# Patient Record
Sex: MALE | Race: WHITE | Employment: OTHER | URBAN - METROPOLITAN AREA
[De-identification: names, ages, dates, MRNs, and addresses within clinical notes are randomized per-mention and may not be internally consistent; named-entity substitution may affect disease eponyms.]

---

## 2018-08-16 ENCOUNTER — TELEPHONE (OUTPATIENT)
Dept: INTERNAL MEDICINE CLINIC | Facility: CLINIC | Age: 63
End: 2018-08-16

## 2018-08-16 NOTE — TELEPHONE ENCOUNTER
Future Appointments  Date Time Provider Sony Roberta   8/24/2018 9:30 AM Milla Chicas MD [facility] MENTAL HEALTH NP ORTHO Jimy Rk   10/1/2018 3:15 PM Alonso Berkowitz MD [facility] IM       Patient is going to see AS as a new patient but not till October.  Pa

## 2018-08-16 NOTE — TELEPHONE ENCOUNTER
Please change PCP and confirm insurance information first then send back to Clinical to address concerns.

## 2018-08-16 NOTE — TELEPHONE ENCOUNTER
Spoke with patient and added Insurance Information(BCBS PPO). Patient would also like a recommendation of a name of a Physician at [facility] for a colonoscopy.   Please advise

## 2018-08-17 NOTE — TELEPHONE ENCOUNTER
Patient notified we would not be able to process any orders until seen by AS, establish appt scheduled for 10/1/18 due to Dr Marya Moran halfway.   Pt given [facility] information along with Dr. Melonie Dubin, Dr Alcala Manpreet al as recommendations for colonoscopy(last

## 2018-09-17 ENCOUNTER — APPOINTMENT (OUTPATIENT)
Dept: LAB | Facility: HOSPITAL | Age: 63
End: 2018-09-17
Attending: ORTHOPAEDIC SURGERY
Payer: COMMERCIAL

## 2018-09-17 DIAGNOSIS — M17.12 OSTEOARTHRITIS OF LEFT KNEE: ICD-10-CM

## 2018-09-17 LAB
ALBUMIN SERPL-MCNC: 3.7 G/DL (ref 3.5–4.8)
ALBUMIN/GLOB SERPL: 1.2 {RATIO} (ref 1–2)
ALP LIVER SERPL-CCNC: 79 U/L (ref 45–117)
ALT SERPL-CCNC: 25 U/L (ref 17–63)
ANION GAP SERPL CALC-SCNC: 7 MMOL/L (ref 0–18)
ANTIBODY SCREEN: NEGATIVE
APTT PPP: 31.3 SECONDS (ref 26.1–34.6)
AST SERPL-CCNC: 18 U/L (ref 15–41)
ATRIAL RATE: 64 BPM
BASOPHILS # BLD AUTO: 0.01 X10(3) UL (ref 0–0.1)
BASOPHILS NFR BLD AUTO: 0.2 %
BILIRUB SERPL-MCNC: 0.4 MG/DL (ref 0.1–2)
BUN BLD-MCNC: 13 MG/DL (ref 8–20)
BUN/CREAT SERPL: 10.2 (ref 10–20)
CALCIUM BLD-MCNC: 8.6 MG/DL (ref 8.3–10.3)
CHLORIDE SERPL-SCNC: 109 MMOL/L (ref 101–111)
CO2 SERPL-SCNC: 26 MMOL/L (ref 22–32)
CREAT BLD-MCNC: 1.27 MG/DL (ref 0.7–1.3)
EOSINOPHIL # BLD AUTO: 0.07 X10(3) UL (ref 0–0.3)
EOSINOPHIL NFR BLD AUTO: 1.7 %
ERYTHROCYTE [DISTWIDTH] IN BLOOD BY AUTOMATED COUNT: 12.5 % (ref 11.5–16)
GLOBULIN PLAS-MCNC: 3 G/DL (ref 2.5–4)
GLUCOSE BLD-MCNC: 104 MG/DL (ref 70–99)
HCT VFR BLD AUTO: 44.6 % (ref 37–53)
HGB BLD-MCNC: 15.1 G/DL (ref 13–17)
IMMATURE GRANULOCYTE COUNT: 0.01 X10(3) UL (ref 0–1)
IMMATURE GRANULOCYTE RATIO %: 0.2 %
INR BLD: 1.05 (ref 0.9–1.1)
LYMPHOCYTES # BLD AUTO: 1.24 X10(3) UL (ref 0.9–4)
LYMPHOCYTES NFR BLD AUTO: 29.5 %
M PROTEIN MFR SERPL ELPH: 6.7 G/DL (ref 6.1–8.3)
MCH RBC QN AUTO: 30 PG (ref 27–33.2)
MCHC RBC AUTO-ENTMCNC: 33.9 G/DL (ref 31–37)
MCV RBC AUTO: 88.7 FL (ref 80–99)
MONOCYTES # BLD AUTO: 0.34 X10(3) UL (ref 0.1–1)
MONOCYTES NFR BLD AUTO: 8.1 %
NEUTROPHIL ABS PRELIM: 2.53 X10 (3) UL (ref 1.3–6.7)
NEUTROPHILS # BLD AUTO: 2.53 X10(3) UL (ref 1.3–6.7)
NEUTROPHILS NFR BLD AUTO: 60.3 %
OSMOLALITY SERPL CALC.SUM OF ELEC: 294 MOSM/KG (ref 275–295)
P AXIS: 49 DEGREES
P-R INTERVAL: 146 MS
PLATELET # BLD AUTO: 224 10(3)UL (ref 150–450)
POTASSIUM SERPL-SCNC: 4.1 MMOL/L (ref 3.6–5.1)
PSA SERPL DL<=0.01 NG/ML-MCNC: 14.1 SECONDS (ref 12.4–14.7)
Q-T INTERVAL: 404 MS
QRS DURATION: 88 MS
QTC CALCULATION (BEZET): 416 MS
R AXIS: 1 DEGREES
RBC # BLD AUTO: 5.03 X10(6)UL (ref 4.3–5.7)
RED CELL DISTRIBUTION WIDTH-SD: 41.1 FL (ref 35.1–46.3)
RH BLOOD TYPE: POSITIVE
SODIUM SERPL-SCNC: 142 MMOL/L (ref 136–144)
T AXIS: 22 DEGREES
VENTRICULAR RATE: 64 BPM
WBC # BLD AUTO: 4.2 X10(3) UL (ref 4–13)

## 2018-09-17 PROCEDURE — 87081 CULTURE SCREEN ONLY: CPT

## 2018-09-17 PROCEDURE — 85730 THROMBOPLASTIN TIME PARTIAL: CPT

## 2018-09-17 PROCEDURE — 93005 ELECTROCARDIOGRAM TRACING: CPT

## 2018-09-17 PROCEDURE — 93010 ELECTROCARDIOGRAM REPORT: CPT | Performed by: INTERNAL MEDICINE

## 2018-09-17 PROCEDURE — 86850 RBC ANTIBODY SCREEN: CPT

## 2018-09-17 PROCEDURE — 86901 BLOOD TYPING SEROLOGIC RH(D): CPT

## 2018-09-17 PROCEDURE — 36415 COLL VENOUS BLD VENIPUNCTURE: CPT

## 2018-09-17 PROCEDURE — 85610 PROTHROMBIN TIME: CPT

## 2018-09-17 PROCEDURE — 86900 BLOOD TYPING SEROLOGIC ABO: CPT

## 2018-09-17 PROCEDURE — 85025 COMPLETE CBC W/AUTO DIFF WBC: CPT

## 2018-09-17 PROCEDURE — 80053 COMPREHEN METABOLIC PANEL: CPT

## 2018-10-01 ENCOUNTER — OFFICE VISIT (OUTPATIENT)
Dept: INTERNAL MEDICINE CLINIC | Facility: CLINIC | Age: 63
End: 2018-10-01
Payer: COMMERCIAL

## 2018-10-01 VITALS
RESPIRATION RATE: 16 BRPM | TEMPERATURE: 99 F | HEIGHT: 74 IN | DIASTOLIC BLOOD PRESSURE: 80 MMHG | WEIGHT: 268 LBS | BODY MASS INDEX: 34.39 KG/M2 | HEART RATE: 68 BPM | SYSTOLIC BLOOD PRESSURE: 124 MMHG

## 2018-10-01 DIAGNOSIS — Z01.818 PRE-OP EXAMINATION: ICD-10-CM

## 2018-10-01 DIAGNOSIS — Z12.5 SCREENING PSA (PROSTATE SPECIFIC ANTIGEN): ICD-10-CM

## 2018-10-01 DIAGNOSIS — Z23 NEEDS FLU SHOT: ICD-10-CM

## 2018-10-01 DIAGNOSIS — Z13.220 SCREENING CHOLESTEROL LEVEL: ICD-10-CM

## 2018-10-01 DIAGNOSIS — N28.1 RENAL CYST, LEFT: ICD-10-CM

## 2018-10-01 DIAGNOSIS — G47.33 OSA (OBSTRUCTIVE SLEEP APNEA): ICD-10-CM

## 2018-10-01 DIAGNOSIS — M17.12 ARTHRITIS OF LEFT KNEE: Primary | ICD-10-CM

## 2018-10-01 DIAGNOSIS — E78.2 MIXED HYPERLIPIDEMIA: ICD-10-CM

## 2018-10-01 PROCEDURE — 99243 OFF/OP CNSLTJ NEW/EST LOW 30: CPT | Performed by: INTERNAL MEDICINE

## 2018-10-01 PROCEDURE — 90471 IMMUNIZATION ADMIN: CPT | Performed by: INTERNAL MEDICINE

## 2018-10-01 PROCEDURE — 90656 IIV3 VACC NO PRSV 0.5 ML IM: CPT | Performed by: INTERNAL MEDICINE

## 2018-10-01 RX ORDER — FLUTICASONE PROPIONATE 50 MCG
2 SPRAY, SUSPENSION (ML) NASAL DAILY
Qty: 16 G | Status: SHIPPED | OUTPATIENT
Start: 2018-10-01

## 2018-10-01 RX ORDER — ALBUTEROL SULFATE 90 UG/1
2 AEROSOL, METERED RESPIRATORY (INHALATION) EVERY 6 HOURS PRN
Qty: 1 INHALER | Refills: 3 | Status: SHIPPED | OUTPATIENT
Start: 2018-10-01

## 2018-10-01 NOTE — PROGRESS NOTES
Patient presents with:  Pre-Op Exam: Pt is having L knee replacement w/ Dr. Cici Ellis on 10/15/18. LB-rm 9      HPI:  Here for pre op for left knee replacement due to arthritis. Pt has stable earle mld uses oral appliance.  He has lost 40 lbs, has high chol prior t Colonoscopy (11/08) & (11/15/13) recheck 5 years - HAYDEN Meehan     Hepatitis C screening [9/17/14] - negative     Prostate cancer screening     Encounter for long-term (current) use of medications     Laboratory examination ordered as part of a routine general Allergies    Strawberries            ANAPHYLAXIS, SWELLING    Comment:All over entire body including throat    Health Maintenance  Immunizations:    Immunization History  Administered            Date(s) Administered    FLU VACC High Dose [facility] Coordination normal.   Skin: Skin is warm and dry. No rash noted. No erythema. No pallor. Psychiatric: Normal mood and affect. EKG in [facility], reviewed the read, unchanged from previous.    labbs reviewed in epic, stable  A/P:    Needs flu shot  Renal cy

## 2018-10-06 ENCOUNTER — HOSPITAL ENCOUNTER (OUTPATIENT)
Dept: ULTRASOUND IMAGING | Age: 63
Discharge: HOME | End: 2018-10-06
Attending: INTERNAL MEDICINE
Payer: COMMERCIAL

## 2018-10-06 DIAGNOSIS — N28.1 RENAL CYST, LEFT: ICD-10-CM

## 2018-10-06 PROCEDURE — 76770 US EXAM ABDO BACK WALL COMP: CPT | Performed by: INTERNAL MEDICINE

## 2018-10-08 PROBLEM — K63.5 POLYP OF COLON: Status: ACTIVE | Noted: 2018-10-08

## 2018-10-08 PROBLEM — K64.8 OTHER HEMORRHOIDS: Status: ACTIVE | Noted: 2018-10-08

## 2018-10-08 PROBLEM — Z86.010 PERSONAL HISTORY OF COLONIC POLYPS: Status: ACTIVE | Noted: 2018-10-08

## 2018-10-08 PROBLEM — Z86.0100 PERSONAL HISTORY OF COLONIC POLYPS: Status: ACTIVE | Noted: 2018-10-08

## 2018-10-08 PROBLEM — K57.30 DIVERTICULOSIS OF LARGE INTESTINE WITHOUT PERFORATION OR ABSCESS WITHOUT BLEEDING: Status: ACTIVE | Noted: 2018-10-08

## 2018-10-09 PROCEDURE — 81015 MICROSCOPIC EXAM OF URINE: CPT | Performed by: UROLOGY

## 2018-10-11 ENCOUNTER — LAB ENCOUNTER (OUTPATIENT)
Dept: LAB | Age: 63
End: 2018-10-11
Attending: INTERNAL MEDICINE
Payer: COMMERCIAL

## 2018-10-11 DIAGNOSIS — Z12.5 SCREENING PSA (PROSTATE SPECIFIC ANTIGEN): ICD-10-CM

## 2018-10-11 DIAGNOSIS — Z13.220 SCREENING CHOLESTEROL LEVEL: ICD-10-CM

## 2018-10-11 PROCEDURE — 80061 LIPID PANEL: CPT

## 2018-10-11 PROCEDURE — 36415 COLL VENOUS BLD VENIPUNCTURE: CPT

## 2018-10-12 ENCOUNTER — HOSPITAL ENCOUNTER (OUTPATIENT)
Dept: CT IMAGING | Age: 63
Discharge: HOME | End: 2018-10-12
Attending: UROLOGY
Payer: COMMERCIAL

## 2018-10-12 DIAGNOSIS — N28.1 COMPLEX RENAL CYST: ICD-10-CM

## 2018-10-12 PROCEDURE — 82565 ASSAY OF CREATININE: CPT

## 2018-10-12 PROCEDURE — 74178 CT ABD&PLV WO CNTR FLWD CNTR: CPT | Performed by: UROLOGY

## 2018-10-15 ENCOUNTER — ANESTHESIA (OUTPATIENT)
Dept: SURGERY | Facility: HOSPITAL | Age: 63
DRG: 470 | End: 2018-10-15
Payer: COMMERCIAL

## 2018-10-15 ENCOUNTER — HOSPITAL ENCOUNTER (INPATIENT)
Facility: HOSPITAL | Age: 63
LOS: 1 days | Discharge: HOME HEALTH | DRG: 470 | End: 2018-10-16
Attending: ORTHOPAEDIC SURGERY | Admitting: ORTHOPAEDIC SURGERY
Payer: COMMERCIAL

## 2018-10-15 ENCOUNTER — APPOINTMENT (OUTPATIENT)
Dept: GENERAL RADIOLOGY | Facility: HOSPITAL | Age: 63
DRG: 470 | End: 2018-10-15
Attending: PHYSICIAN ASSISTANT
Payer: COMMERCIAL

## 2018-10-15 ENCOUNTER — ANESTHESIA EVENT (OUTPATIENT)
Dept: SURGERY | Facility: HOSPITAL | Age: 63
DRG: 470 | End: 2018-10-15
Payer: COMMERCIAL

## 2018-10-15 DIAGNOSIS — M17.12 OSTEOARTHRITIS OF LEFT KNEE: Primary | ICD-10-CM

## 2018-10-15 DIAGNOSIS — M17.12 PRIMARY OSTEOARTHRITIS OF LEFT KNEE: ICD-10-CM

## 2018-10-15 PROCEDURE — 73560 X-RAY EXAM OF KNEE 1 OR 2: CPT | Performed by: PHYSICIAN ASSISTANT

## 2018-10-15 PROCEDURE — 99221 1ST HOSP IP/OBS SF/LOW 40: CPT | Performed by: INTERNAL MEDICINE

## 2018-10-15 PROCEDURE — 0SRD0J9 REPLACEMENT OF LEFT KNEE JOINT WITH SYNTHETIC SUBSTITUTE, CEMENTED, OPEN APPROACH: ICD-10-PCS | Performed by: ORTHOPAEDIC SURGERY

## 2018-10-15 PROCEDURE — 3E0T3BZ INTRODUCTION OF ANESTHETIC AGENT INTO PERIPHERAL NERVES AND PLEXI, PERCUTANEOUS APPROACH: ICD-10-PCS | Performed by: ANESTHESIOLOGY

## 2018-10-15 DEVICE — ATTUNE KNEE SYSTEM TIBIAL BASE ROTATING PLATFORM SIZE 9 CEMENTED
Type: IMPLANTABLE DEVICE | Site: KNEE | Status: FUNCTIONAL
Brand: ATTUNE

## 2018-10-15 DEVICE — ATTUNE KNEE SYSTEM FEMORAL POSTERIOR STABILIZED SIZE 8 LEFT CEMENTED
Type: IMPLANTABLE DEVICE | Site: KNEE | Status: FUNCTIONAL
Brand: ATTUNE

## 2018-10-15 DEVICE — CEMENT BONE RADIOPAQ HOWMEDICA: Type: IMPLANTABLE DEVICE | Site: KNEE | Status: FUNCTIONAL

## 2018-10-15 DEVICE — ATTUNE KNEE SYSTEM TIBIAL INSERT ROTATING PLATFORM POSTERIOR STABILIZED 8 7MM AOX
Type: IMPLANTABLE DEVICE | Site: KNEE | Status: FUNCTIONAL
Brand: ATTUNE

## 2018-10-15 RX ORDER — MEPERIDINE HYDROCHLORIDE 25 MG/ML
12.5 INJECTION INTRAMUSCULAR; INTRAVENOUS; SUBCUTANEOUS AS NEEDED
Status: DISCONTINUED | OUTPATIENT
Start: 2018-10-15 | End: 2018-10-15 | Stop reason: HOSPADM

## 2018-10-15 RX ORDER — NALOXONE HYDROCHLORIDE 0.4 MG/ML
80 INJECTION, SOLUTION INTRAMUSCULAR; INTRAVENOUS; SUBCUTANEOUS AS NEEDED
Status: DISCONTINUED | OUTPATIENT
Start: 2018-10-15 | End: 2018-10-15 | Stop reason: HOSPADM

## 2018-10-15 RX ORDER — DOCUSATE SODIUM 100 MG/1
100 CAPSULE, LIQUID FILLED ORAL 2 TIMES DAILY
Status: DISCONTINUED | OUTPATIENT
Start: 2018-10-15 | End: 2018-10-16

## 2018-10-15 RX ORDER — METOCLOPRAMIDE HYDROCHLORIDE 5 MG/ML
10 INJECTION INTRAMUSCULAR; INTRAVENOUS AS NEEDED
Status: DISCONTINUED | OUTPATIENT
Start: 2018-10-15 | End: 2018-10-15 | Stop reason: HOSPADM

## 2018-10-15 RX ORDER — TIZANIDINE 2 MG/1
2 TABLET ORAL EVERY 6 HOURS PRN
Qty: 60 TABLET | Refills: 0 | Status: SHIPPED | OUTPATIENT
Start: 2018-10-15 | End: 2019-06-05 | Stop reason: ALTCHOICE

## 2018-10-15 RX ORDER — DIPHENHYDRAMINE HYDROCHLORIDE 50 MG/ML
25 INJECTION INTRAMUSCULAR; INTRAVENOUS ONCE AS NEEDED
Status: ACTIVE | OUTPATIENT
Start: 2018-10-15 | End: 2018-10-15

## 2018-10-15 RX ORDER — CHLORAL HYDRATE 500 MG
1000 CAPSULE ORAL DAILY
Status: ON HOLD | COMMUNITY
End: 2018-10-15

## 2018-10-15 RX ORDER — KETOROLAC TROMETHAMINE 30 MG/ML
30 INJECTION, SOLUTION INTRAMUSCULAR; INTRAVENOUS EVERY 6 HOURS
Status: COMPLETED | OUTPATIENT
Start: 2018-10-15 | End: 2018-10-16

## 2018-10-15 RX ORDER — SCOLOPAMINE TRANSDERMAL SYSTEM 1 MG/1
1 PATCH, EXTENDED RELEASE TRANSDERMAL ONCE
Status: DISCONTINUED | OUTPATIENT
Start: 2018-10-15 | End: 2018-10-16

## 2018-10-15 RX ORDER — FEXOFENADINE HCL 180 MG/1
180 TABLET ORAL DAILY
COMMUNITY

## 2018-10-15 RX ORDER — ACETAMINOPHEN 325 MG/1
TABLET ORAL
Status: COMPLETED
Start: 2018-10-15 | End: 2018-10-15

## 2018-10-15 RX ORDER — ONDANSETRON 2 MG/ML
4 INJECTION INTRAMUSCULAR; INTRAVENOUS EVERY 4 HOURS PRN
Status: DISCONTINUED | OUTPATIENT
Start: 2018-10-15 | End: 2018-10-16

## 2018-10-15 RX ORDER — OXYCODONE HYDROCHLORIDE 10 MG/1
10 TABLET ORAL EVERY 4 HOURS PRN
Status: DISCONTINUED | OUTPATIENT
Start: 2018-10-15 | End: 2018-10-16

## 2018-10-15 RX ORDER — SODIUM CHLORIDE 9 MG/ML
INJECTION, SOLUTION INTRAVENOUS CONTINUOUS
Status: DISCONTINUED | OUTPATIENT
Start: 2018-10-15 | End: 2018-10-16

## 2018-10-15 RX ORDER — MELATONIN
325
Status: DISCONTINUED | OUTPATIENT
Start: 2018-10-16 | End: 2018-10-16

## 2018-10-15 RX ORDER — OXYCODONE HCL 10 MG/1
10 TABLET, FILM COATED, EXTENDED RELEASE ORAL
Status: COMPLETED | OUTPATIENT
Start: 2018-10-15 | End: 2018-10-15

## 2018-10-15 RX ORDER — METOCLOPRAMIDE HYDROCHLORIDE 5 MG/ML
10 INJECTION INTRAMUSCULAR; INTRAVENOUS EVERY 6 HOURS PRN
Status: DISCONTINUED | OUTPATIENT
Start: 2018-10-15 | End: 2018-10-16

## 2018-10-15 RX ORDER — SODIUM CHLORIDE, SODIUM LACTATE, POTASSIUM CHLORIDE, CALCIUM CHLORIDE 600; 310; 30; 20 MG/100ML; MG/100ML; MG/100ML; MG/100ML
INJECTION, SOLUTION INTRAVENOUS CONTINUOUS
Status: DISCONTINUED | OUTPATIENT
Start: 2018-10-15 | End: 2018-10-16

## 2018-10-15 RX ORDER — SODIUM CHLORIDE, SODIUM LACTATE, POTASSIUM CHLORIDE, CALCIUM CHLORIDE 600; 310; 30; 20 MG/100ML; MG/100ML; MG/100ML; MG/100ML
INJECTION, SOLUTION INTRAVENOUS CONTINUOUS
Status: DISCONTINUED | OUTPATIENT
Start: 2018-10-15 | End: 2018-10-15 | Stop reason: HOSPADM

## 2018-10-15 RX ORDER — ACETAMINOPHEN 500 MG
1000 TABLET ORAL ONCE
Status: DISCONTINUED | OUTPATIENT
Start: 2018-10-15 | End: 2018-10-15 | Stop reason: HOSPADM

## 2018-10-15 RX ORDER — MORPHINE SULFATE 4 MG/ML
2 INJECTION, SOLUTION INTRAMUSCULAR; INTRAVENOUS EVERY 5 MIN PRN
Status: DISCONTINUED | OUTPATIENT
Start: 2018-10-15 | End: 2018-10-15 | Stop reason: HOSPADM

## 2018-10-15 RX ORDER — MORPHINE SULFATE 4 MG/ML
1 INJECTION, SOLUTION INTRAMUSCULAR; INTRAVENOUS EVERY 2 HOUR PRN
Status: DISCONTINUED | OUTPATIENT
Start: 2018-10-15 | End: 2018-10-16

## 2018-10-15 RX ORDER — TRAMADOL HYDROCHLORIDE 50 MG/1
50 TABLET ORAL EVERY 6 HOURS
Status: DISCONTINUED | OUTPATIENT
Start: 2018-10-15 | End: 2018-10-16

## 2018-10-15 RX ORDER — OXYCODONE HYDROCHLORIDE 5 MG/1
5 TABLET ORAL EVERY 4 HOURS PRN
Status: DISCONTINUED | OUTPATIENT
Start: 2018-10-15 | End: 2018-10-16

## 2018-10-15 RX ORDER — DIPHENHYDRAMINE HYDROCHLORIDE 50 MG/ML
12.5 INJECTION INTRAMUSCULAR; INTRAVENOUS EVERY 4 HOURS PRN
Status: DISCONTINUED | OUTPATIENT
Start: 2018-10-15 | End: 2018-10-16

## 2018-10-15 RX ORDER — MORPHINE SULFATE 4 MG/ML
2 INJECTION, SOLUTION INTRAMUSCULAR; INTRAVENOUS EVERY 2 HOUR PRN
Status: DISCONTINUED | OUTPATIENT
Start: 2018-10-15 | End: 2018-10-16

## 2018-10-15 RX ORDER — ALBUTEROL SULFATE 90 UG/1
2 AEROSOL, METERED RESPIRATORY (INHALATION) EVERY 6 HOURS PRN
Status: DISCONTINUED | OUTPATIENT
Start: 2018-10-15 | End: 2018-10-16

## 2018-10-15 RX ORDER — OXYCODONE HYDROCHLORIDE 15 MG/1
15 TABLET ORAL EVERY 4 HOURS PRN
Status: DISCONTINUED | OUTPATIENT
Start: 2018-10-15 | End: 2018-10-16

## 2018-10-15 RX ORDER — DIPHENHYDRAMINE HCL 25 MG
25 CAPSULE ORAL EVERY 4 HOURS PRN
Status: DISCONTINUED | OUTPATIENT
Start: 2018-10-15 | End: 2018-10-16

## 2018-10-15 RX ORDER — ACETAMINOPHEN 325 MG/1
650 TABLET ORAL 4 TIMES DAILY
Status: DISCONTINUED | OUTPATIENT
Start: 2018-10-15 | End: 2018-10-16

## 2018-10-15 RX ORDER — DOCUSATE SODIUM 100 MG/1
100 CAPSULE, LIQUID FILLED ORAL 2 TIMES DAILY
Qty: 60 CAPSULE | Refills: 0 | Status: SHIPPED | OUTPATIENT
Start: 2018-10-15 | End: 2019-06-05 | Stop reason: ALTCHOICE

## 2018-10-15 RX ORDER — ASPIRIN 81 MG/1
81 TABLET ORAL DAILY
Status: ON HOLD | COMMUNITY
End: 2018-10-15

## 2018-10-15 RX ORDER — SODIUM PHOSPHATE, DIBASIC AND SODIUM PHOSPHATE, MONOBASIC 7; 19 G/133ML; G/133ML
1 ENEMA RECTAL ONCE AS NEEDED
Status: DISCONTINUED | OUTPATIENT
Start: 2018-10-15 | End: 2018-10-16

## 2018-10-15 RX ORDER — OXYCODONE HYDROCHLORIDE AND ACETAMINOPHEN 5; 325 MG/1; MG/1
1 TABLET ORAL EVERY 4 HOURS PRN
Qty: 80 TABLET | Refills: 0 | Status: SHIPPED | OUTPATIENT
Start: 2018-10-15 | End: 2018-10-25

## 2018-10-15 RX ORDER — BISACODYL 10 MG
10 SUPPOSITORY, RECTAL RECTAL
Status: DISCONTINUED | OUTPATIENT
Start: 2018-10-15 | End: 2018-10-16

## 2018-10-15 RX ORDER — MORPHINE SULFATE 4 MG/ML
4 INJECTION, SOLUTION INTRAMUSCULAR; INTRAVENOUS EVERY 2 HOUR PRN
Status: DISCONTINUED | OUTPATIENT
Start: 2018-10-15 | End: 2018-10-16

## 2018-10-15 RX ORDER — LABETALOL HYDROCHLORIDE 5 MG/ML
5 INJECTION, SOLUTION INTRAVENOUS EVERY 5 MIN PRN
Status: DISCONTINUED | OUTPATIENT
Start: 2018-10-15 | End: 2018-10-15 | Stop reason: HOSPADM

## 2018-10-15 RX ORDER — ONDANSETRON 2 MG/ML
4 INJECTION INTRAMUSCULAR; INTRAVENOUS AS NEEDED
Status: DISCONTINUED | OUTPATIENT
Start: 2018-10-15 | End: 2018-10-15 | Stop reason: HOSPADM

## 2018-10-15 RX ORDER — SENNOSIDES 8.6 MG
17.2 TABLET ORAL NIGHTLY
Status: DISCONTINUED | OUTPATIENT
Start: 2018-10-15 | End: 2018-10-16

## 2018-10-15 RX ORDER — POLYETHYLENE GLYCOL 3350 17 G/17G
17 POWDER, FOR SOLUTION ORAL DAILY PRN
Status: DISCONTINUED | OUTPATIENT
Start: 2018-10-15 | End: 2018-10-16

## 2018-10-15 RX ORDER — FLUTICASONE PROPIONATE 50 MCG
2 SPRAY, SUSPENSION (ML) NASAL DAILY
Status: DISCONTINUED | OUTPATIENT
Start: 2018-10-15 | End: 2018-10-16

## 2018-10-15 RX ORDER — TIZANIDINE 4 MG/1
4 TABLET ORAL 3 TIMES DAILY PRN
Status: DISCONTINUED | OUTPATIENT
Start: 2018-10-15 | End: 2018-10-16

## 2018-10-15 RX ORDER — MIDAZOLAM HYDROCHLORIDE 1 MG/ML
1 INJECTION INTRAMUSCULAR; INTRAVENOUS EVERY 5 MIN PRN
Status: DISCONTINUED | OUTPATIENT
Start: 2018-10-15 | End: 2018-10-15 | Stop reason: HOSPADM

## 2018-10-15 RX ORDER — CELECOXIB 200 MG/1
200 CAPSULE ORAL DAILY
Qty: 30 CAPSULE | Refills: 0 | Status: SHIPPED | OUTPATIENT
Start: 2018-10-15 | End: 2018-11-12

## 2018-10-15 RX ORDER — OXYCODONE HCL 10 MG/1
10 TABLET, FILM COATED, EXTENDED RELEASE ORAL
Status: DISCONTINUED | OUTPATIENT
Start: 2018-10-15 | End: 2018-10-16

## 2018-10-15 RX ORDER — ACETAMINOPHEN 325 MG/1
650 TABLET ORAL ONCE
Status: COMPLETED | OUTPATIENT
Start: 2018-10-15 | End: 2018-10-15

## 2018-10-15 NOTE — ANESTHESIA POSTPROCEDURE EVALUATION
[address] [facility] Patient Status:  Surgery Admit   Age/Gender 58year old male MRN [id number]   [facility] SURGERY Attending Wilfredo Goode MD   Hosp Day # 0 PCP Marcellus Chen MD       Anesthesia Post-op Note    Proced

## 2018-10-15 NOTE — PLAN OF CARE
RECD FROM RR PER BED, ALERT ,AWAKE, ORIENTED X4. UNABLE TO WIGGLES TOES YET. PEDAL PULSES PALPABLE. CALL LIGHT W/IN REACH. INSTRUCTED US IUSE AND TO CALL FOR ALL NEEDS AND ASSISTANCE. CALL LIGHT W/IN REACH. HR LOW 50 PUT ON TELE.  SKIN WARM AN DRY

## 2018-10-15 NOTE — ANESTHESIA PREPROCEDURE EVALUATION
PRE-OP EVALUATION    Patient Name: Jonathan Matta    Pre-op Diagnosis: Primary osteoarthritis of left knee [M17.12]    Procedure(s):  LEFT TOTAL KNEE REPLACEMENT    Surgeon(s) and Role:     Rosemarie Smith MD - Primary    Pre-op vitals reviewed. Pulmonary                    (+) sleep apnea       Neuro/Psych                              Patient Active Problem List:     ROUTINE GENERAL EXAM - 11/7/17*     BMI, (Adult) 40.0-44.9 kg/sq m (BMI 30 ~ 230 lbs) ([facility])     H/O (9/13) Rt inguinal hernia rep Available pre-op labs reviewed.   Lab Results   Component Value Date    WBC 4.2 09/17/2018    RBC 5.03 09/17/2018    HGB 15.1 09/17/2018    HCT 44.6 09/17/2018    MCV 88.7 09/17/2018    MCH 30.0 09/17/2018    MCHC 33.9 09/17/2018    RDW 12.5 09/17/2018

## 2018-10-15 NOTE — PROGRESS NOTES
Yola Geovany Matta   8/24/2018 9:30 AM   Office Visit   MRN:  [id number]   Description: 60year old male Provider: Kj Chicas MD Department: [facility]   Scanning Cover Sheet     Click to print Barcode Encounter Cover Sheet for scanning   Off Left knee has bone-on-bone medial compartment. Right knee has significant decrease in range of motion along the medial edge of the medial compartment.   This is present on the flexed weightbearing view.     Bilateral knee primary osteoarthritis:  Left knee

## 2018-10-15 NOTE — OPERATIVE REPORT
TOTAL KNEE OPERATIVE REPORT:  Merline Cue [facility]       [id number]     9/29/1955    PREOP DX: LEFT  KNEE PRIMARY OSTEOARTHRITIS  POSTOP DX:LEFT KNEE PRIMARY OSTEOARTHRITIS  PROCEDURE: LEFT TOTAL KNEE REPLACEMENT  SURGEON: Yue Gonzalez MD  FIRST ASSIST: E STABLE. FEMUR WAS FINISHED OFF WITH CHAMFER AND [address] CUTS IN USUAL FASHION. POSTERIOR FEMORAL OSTEOPHYTES WERE REMOVED. POSTERIOR CAPSULAR RELEASE WAS DONE CAREFULLY. PROXIMAL TIBIA WAS EXPOSED.   TIBIA WAS SIZED at 9 AND ROTATION WAS SET USING MEDI

## 2018-10-15 NOTE — H&P
[address] Matta Patient Status:  Surgery Admit    1955 MRN [id number]   Location [address] [place] [address] Attending Js Resendez MD   Hosp Day # 0 PCP Janett Weinberg MD     History of Pres by mouth daily. Disp:  Rfl:  10/1/2018   aspirin EC 81 MG Oral Tab EC Take 81 mg by mouth daily. Disp:  Rfl:  10/1/2018   Multiple Vitamin (MULTI-VITAMIN DAILY OR) Take 1 tablet by mouth daily.    Disp:  Rfl:  Taking   Albuterol Sulfate HFA (PROVENTIL HFA) anesthesia and other medical complications explained. All questions were encouraged and answered. Patient consents to TKR.         Charles Bolton  10/15/2018  1:36 PM

## 2018-10-16 VITALS
SYSTOLIC BLOOD PRESSURE: 113 MMHG | TEMPERATURE: 98 F | OXYGEN SATURATION: 97 % | DIASTOLIC BLOOD PRESSURE: 71 MMHG | RESPIRATION RATE: 11 BRPM | WEIGHT: 266.13 LBS | BODY MASS INDEX: 34.15 KG/M2 | HEIGHT: 74 IN | HEART RATE: 68 BPM

## 2018-10-16 NOTE — PHYSICAL THERAPY NOTE
PHYSICAL THERAPY KNEE EVALUATION - INPATIENT     Room Number: 366/366-A  Evaluation Date: 10/16/2018  Type of Evaluation: Initial  Physician Order: PT Eval and Treat    Presenting Problem: L TKA on 10/15/18  Reason for Therapy: Mobility Dysfunction and Dis ASSESSMENT  Upper extremity ROM and strength are within functional limits     Lower extremity ROM is within functional limits with the exception of left knee due to recent surgery    Lower extremity strength is within functional limits with the exception o pattern. Stand to sit with supervision. Exercise/Education Provided:  Bed mobility  Gait training  Transfer training    Patient End of Session: Up in chair;Needs met;Call light within reach;RN aware of session/findings; Family present; Ice applied    ASS extension to 95 degrees flexion      Goal #6        Goal Comments: Goals established on 10/16/2018

## 2018-10-16 NOTE — OCCUPATIONAL THERAPY NOTE
OCCUPATIONAL THERAPY QUICK EVALUATION - INPATIENT    Room Number: 366/366-A  Evaluation Date: 10/16/2018     Type of Evaluation: Quick Eval  Presenting Problem: s/p L TKR    Physician Order: IP Consult to Occupational Therapy  Reason for Therapy:  ADL/IADL Weight Bearing as Tolerated    PAIN ASSESSMENT  Ratin  Location: left knee  Management Techniques: Repositioning    COGNITION  WNL    RANGE OF MOTION AND STRENGTH ASSESSMENT  Upper extremity ROM is within functional limits     Upper extremity strength functioning at mod I to supervision level for basic self care and functional mobility. Demonstrates good understanding regarding precautions. Patient has good family support and good set-up at home.   No further skilled OT intervention warranted at this

## 2018-10-16 NOTE — PHYSICAL THERAPY NOTE
PHYSICAL THERAPY KNEE TREATMENT NOTE - INPATIENT     Room Number: 366/366-A     Session: 1 and 2   Number of Visits to Meet Established Goals: 4    Presenting Problem: L TKA on 10/15/18    Problem List  Active Problems:    Osteoarthritis of left knee    S None   How much help from another person does the patient currently need. ..   -   Moving to and from a bed to a chair (including a wheelchair)?: None   -   Need to walk in hospital room?: None   -   Climbing 3-5 steps with a railing?: A Little       AM-PAC PT    PLAN  PT Treatment Plan: Bed mobility;Gait training;Range of motion;Strengthening;Stoop training;Stair training;Transfer training  Rehab Potential : Good  Frequency (Obs): BID    CURRENT GOALS   Goal #1     Patient is able to demonstrate supine - sit

## 2018-10-16 NOTE — CM/SW NOTE
Pt is sp total knee replacement. Preop joint journey plan at  [facility] ortho is for pt to go directly to outpatient therapy. Post op PT recommendations for outpatient as well. Pt has first session already scheduled for end of week.      Patient was screened

## 2018-10-16 NOTE — CONSULTS
Gastroenterology Initial Consultation  I have personally seen and examined the patient.     Patient Name: Brandon Guidry  Referring physician: Dr. Christi Townsend   Reason for consultation: Anticoagulation  CC: Antiocoagulation  HPI: This is a 59 yo male wi IVPB 3 g Intravenous Once   [COMPLETED] acetaminophen (TYLENOL) tab 650 mg 650 mg Oral Once   acetaminophen (TYLENOL) tab 650 mg 650 mg Oral QID   TiZANidine HCl (ZANAFLEX) tab 4 mg 4 mg Oral TID PRN   oxyCODONE HCl (OXY-IR) cap/tab 5 mg 5 mg Oral Q4H PRN Q24H   [COMPLETED] CeFAZolin Sodium (ANCEF) 3 g in sodium chloride 0.9% 100 mL IVPB 3 g Intravenous Q8H   Albuterol Sulfate  (90 Base) MCG/ACT inhaler 2 puff 2 puff Inhalation Q6H PRN   Fluticasone Propionate (FLONASE) 50 MCG/ACT nasal spray 2 spray Location: Right arm)   Pulse 77   Temp 98.5 °F (36.9 °C) (Oral)   Resp 15   Ht 74\"   Wt 266 lb 1.5 oz   SpO2 96%   BMI 34.16 kg/m²   Gen: AAO x 3, able to speak in complete sentences  HENT: NCAT, EOMI, PERRL, oropharynx is clear with moist mucosal membran felt appropriate by Dr. Samir Tran, for post-op DVT prophylaxis. The patient does have a slightly increased risk of post-polypectomy bleeding. However that risk is low, and thus anticoagulation can be implemented safely.   Please feel free to call us if you have

## 2018-10-16 NOTE — PROGRESS NOTES
[facility] Pharmacy Note: Antimicrobial Weight Dose Adjustment for: cefazolin (ANCEF)    Evans Oneal is a 58year old male who has been prescribed cefazolin (ANCEF) 2 gm IV every 8 hours x 2 doses post-op. Pt weighs 120.7 kg.   Based on this criteria

## 2018-10-16 NOTE — PROGRESS NOTES
[facility] Patient Status:  Inpatient    1955 MRN [id number]   [facility] 3SW-A Attending Zohra Thomas MD   Hosp Day # 1 PCP Mliad Norman MD         S:  Patient doing well. No nausea.   No SOB, CP or

## 2018-10-16 NOTE — CONSULTS
[facility]  Marifer Reina [facility] Patient Status:  Inpatient    1955 MRN [id number]   [facility] 3SW-A Attending Donald Vasquez MD   Hosp Day # 0 PCP Apryl Gilliam MD     Reason for consult: [address] [place] [address] medications on file prior to encounter. Current Outpatient Medications on File Prior to Encounter:  Fexofenadine HCl 180 MG Oral Tab Take 180 mg by mouth daily. Disp:  Rfl:    aspirin EC 81 MG Oral Tab EC Take 81 mg by mouth daily.  Disp:  Rfl:    [DISCON / PLAN:     1. OA left knee s/p TKA POD 0  1. Pain control   2. CBC in am   3. PT OT   4. Per ortho  5. Xarelto  2.  S/p polypectomy  1. GI consulted to further weigh in on timing of anticoagulation     Quality:  · DVT Prophylaxis: Xarelto  · CODE status:fu

## 2018-11-07 ENCOUNTER — OFFICE VISIT (OUTPATIENT)
Dept: INTERNAL MEDICINE CLINIC | Facility: CLINIC | Age: 63
End: 2018-11-07
Payer: COMMERCIAL

## 2018-11-07 ENCOUNTER — PATIENT OUTREACH (OUTPATIENT)
Dept: CASE MANAGEMENT | Age: 63
End: 2018-11-07

## 2018-11-07 VITALS
TEMPERATURE: 98 F | RESPIRATION RATE: 16 BRPM | BODY MASS INDEX: 33.75 KG/M2 | DIASTOLIC BLOOD PRESSURE: 76 MMHG | SYSTOLIC BLOOD PRESSURE: 122 MMHG | HEIGHT: 74 IN | HEART RATE: 80 BPM | WEIGHT: 263 LBS

## 2018-11-07 DIAGNOSIS — N28.1 RENAL CYST, LEFT: ICD-10-CM

## 2018-11-07 DIAGNOSIS — D36.9 TUBULAR ADENOMA: ICD-10-CM

## 2018-11-07 DIAGNOSIS — Z96.652 S/P TOTAL KNEE ARTHROPLASTY, LEFT: Primary | ICD-10-CM

## 2018-11-07 PROCEDURE — 99214 OFFICE O/P EST MOD 30 MIN: CPT | Performed by: INTERNAL MEDICINE

## 2018-11-07 NOTE — PROGRESS NOTES
Patient presents with:  Post-Op: RE rm 8: post- op      HPI:  Here for f/u from left knee replacement, doing well with PT, pt had c-scope with urular ademnoma and a ct abd pelvis for renal cysts to be rpeated in 6 mos with gu. No other commplaints.      Rev 108 (90 Base) MCG/ACT Inhalation Aero Soln Inhale 2 puffs into the lungs every 6 (six) hours as needed for Wheezing. Disp: 1 Inhaler Rfl: 3   Fluticasone Propionate (FLONASE) 50 MCG/ACT Nasal Suspension 2 sprays by Nasal route daily.  Disp: 16 g Rfl: prn Instructions on file for this visit. All questions were answered and the patient understands the plan.

## 2018-11-28 ENCOUNTER — PATIENT MESSAGE (OUTPATIENT)
Dept: CASE MANAGEMENT | Age: 63
End: 2018-11-28

## 2018-12-04 NOTE — PROGRESS NOTES
Multiple attempts made to reach the pt for condition update however unable to reach the pt.  Closing encounter.

## 2018-12-12 PROBLEM — D03.72 MELANOMA IN SITU OF LEFT LOWER EXTREMITY (HCC): Status: ACTIVE | Noted: 2018-12-12

## 2019-06-05 ENCOUNTER — OFFICE VISIT (OUTPATIENT)
Dept: INTERNAL MEDICINE CLINIC | Facility: CLINIC | Age: 64
End: 2019-06-05
Payer: COMMERCIAL

## 2019-06-05 VITALS
SYSTOLIC BLOOD PRESSURE: 114 MMHG | HEIGHT: 74 IN | BODY MASS INDEX: 36.19 KG/M2 | DIASTOLIC BLOOD PRESSURE: 72 MMHG | RESPIRATION RATE: 16 BRPM | HEART RATE: 72 BPM | TEMPERATURE: 98 F | WEIGHT: 282 LBS

## 2019-06-05 DIAGNOSIS — D03.72 MELANOMA IN SITU OF LEFT LOWER EXTREMITY (HCC): ICD-10-CM

## 2019-06-05 DIAGNOSIS — G47.33 OSA (OBSTRUCTIVE SLEEP APNEA): ICD-10-CM

## 2019-06-05 DIAGNOSIS — E78.2 MIXED HYPERLIPIDEMIA: Primary | ICD-10-CM

## 2019-06-05 DIAGNOSIS — N28.1 RENAL CYST, LEFT: ICD-10-CM

## 2019-06-05 DIAGNOSIS — Z96.652 S/P TOTAL KNEE ARTHROPLASTY, LEFT: ICD-10-CM

## 2019-06-05 PROCEDURE — 99214 OFFICE O/P EST MOD 30 MIN: CPT | Performed by: INTERNAL MEDICINE

## 2019-06-05 NOTE — PROGRESS NOTES
Patient presents with: Follow - Up: 6 month f/u. LB-rm 9      HPI:  Here for fu of high chol renal cyst, stabe due for labs in October, seeing gu for the cysts, ct f/u ordered. Pt has earle, stable, suing oral appliance. Seen by derm for hx of melanoma.  Pt MCG/ACT Inhalation Aero Soln Inhale 2 puffs into the lungs every 6 (six) hours as needed for Wheezing. Disp: 1 Inhaler Rfl: 3   Fluticasone Propionate (FLONASE) 50 MCG/ACT Nasal Suspension 2 sprays by Nasal route daily.  Disp: 16 g Rfl: prn   cephALEXin 500 questions were answered and the patient understands the plan.

## 2019-10-09 ENCOUNTER — LAB ENCOUNTER (OUTPATIENT)
Dept: LAB | Age: 64
End: 2019-10-09
Attending: INTERNAL MEDICINE
Payer: MEDICARE

## 2019-10-09 ENCOUNTER — OFFICE VISIT (OUTPATIENT)
Dept: INTERNAL MEDICINE CLINIC | Facility: CLINIC | Age: 64
End: 2019-10-09
Payer: MEDICARE

## 2019-10-09 VITALS
HEART RATE: 84 BPM | HEIGHT: 74 IN | WEIGHT: 284 LBS | TEMPERATURE: 98 F | DIASTOLIC BLOOD PRESSURE: 72 MMHG | BODY MASS INDEX: 36.45 KG/M2 | SYSTOLIC BLOOD PRESSURE: 112 MMHG | RESPIRATION RATE: 16 BRPM

## 2019-10-09 DIAGNOSIS — G47.33 OSA (OBSTRUCTIVE SLEEP APNEA): ICD-10-CM

## 2019-10-09 DIAGNOSIS — Z12.5 SCREENING PSA (PROSTATE SPECIFIC ANTIGEN): ICD-10-CM

## 2019-10-09 DIAGNOSIS — K63.5 POLYP OF COLON, UNSPECIFIED PART OF COLON, UNSPECIFIED TYPE: ICD-10-CM

## 2019-10-09 DIAGNOSIS — J31.0 MIXED RHINITIS: ICD-10-CM

## 2019-10-09 DIAGNOSIS — E78.2 MIXED HYPERLIPIDEMIA: Primary | ICD-10-CM

## 2019-10-09 DIAGNOSIS — Z00.00 ENCOUNTER FOR ANNUAL HEALTH EXAMINATION: ICD-10-CM

## 2019-10-09 DIAGNOSIS — M17.12 PRIMARY OSTEOARTHRITIS OF LEFT KNEE: ICD-10-CM

## 2019-10-09 DIAGNOSIS — Z86.010 PERSONAL HISTORY OF COLONIC POLYPS: ICD-10-CM

## 2019-10-09 DIAGNOSIS — D36.9 TUBULAR ADENOMA: ICD-10-CM

## 2019-10-09 DIAGNOSIS — D03.72 MELANOMA IN SITU OF LEFT LOWER EXTREMITY (HCC): ICD-10-CM

## 2019-10-09 DIAGNOSIS — E78.2 MIXED HYPERLIPIDEMIA: ICD-10-CM

## 2019-10-09 DIAGNOSIS — K57.30 DIVERTICULOSIS OF LARGE INTESTINE WITHOUT PERFORATION OR ABSCESS WITHOUT BLEEDING: ICD-10-CM

## 2019-10-09 DIAGNOSIS — N28.1 RENAL CYST, LEFT: ICD-10-CM

## 2019-10-09 DIAGNOSIS — K64.8 OTHER HEMORRHOIDS: ICD-10-CM

## 2019-10-09 PROCEDURE — 80053 COMPREHEN METABOLIC PANEL: CPT

## 2019-10-09 PROCEDURE — G0008 ADMIN INFLUENZA VIRUS VAC: HCPCS | Performed by: INTERNAL MEDICINE

## 2019-10-09 PROCEDURE — 84443 ASSAY THYROID STIM HORMONE: CPT

## 2019-10-09 PROCEDURE — G0402 INITIAL PREVENTIVE EXAM: HCPCS | Performed by: INTERNAL MEDICINE

## 2019-10-09 PROCEDURE — 80061 LIPID PANEL: CPT

## 2019-10-09 PROCEDURE — G0009 ADMIN PNEUMOCOCCAL VACCINE: HCPCS | Performed by: INTERNAL MEDICINE

## 2019-10-09 PROCEDURE — 90662 IIV NO PRSV INCREASED AG IM: CPT | Performed by: INTERNAL MEDICINE

## 2019-10-09 PROCEDURE — 85025 COMPLETE CBC W/AUTO DIFF WBC: CPT

## 2019-10-09 PROCEDURE — 90670 PCV13 VACCINE IM: CPT | Performed by: INTERNAL MEDICINE

## 2019-10-09 RX ORDER — AMOXICILLIN 500 MG/1
CAPSULE ORAL
Refills: 0 | COMMUNITY
Start: 2019-08-05 | End: 2020-10-05 | Stop reason: ALTCHOICE

## 2019-10-09 NOTE — PROGRESS NOTES
HPI:   Cherylene Morelle is a 71year old male who presents for a Medicare Initial Preventative Physical Exam (Welcome to Medicare- < 12 months on Medicare). Here for wtm. Doing well due for labs.    His last annual assessment has been over 1 yea [Statins cause myalgias] / LDL Goal // Ultrafast Heart Scan Score [4/12] = 0     Mixed rhinitis / Rx: Fexofenadine, Fluticasone     DANTE (obstructive sleep apnea) / PFT's [10/13] Normal / Rx Oral Appliance [3/14] [address]     Renal cyst x knee arthroplasty, left, S/P [5/15] Lt knee lateral meniscus repair - HAYDEN Hanson (4/23/2015), Visual impairment, and Wears glasses.     He  has a past surgical history that includes tonsillectomy; hernia surgery (9-16-13 [facility]); colonoscopy (11/13/2013); an or JVD   Back:   Symmetric, no curvature, ROM normal, no CVA tenderness   Lungs:   Clear to auscultation bilaterally, respirations unlabored   Chest Wall:  No tenderness or deformity   Heart:  Regular rate and rhythm, S1, S2 normal, no murmur, rub or valiente Future  -     LIPID PANEL; Future  -     TSH W REFLEX TO FREE T4; Future  Stable cotinue meds  DANTE (obstructive sleep apnea) / PFT's [10/13] Normal / Rx Oral Appliance [3/14] [address];  Future  - AND SCHEDULE Internal Lab or Procedure External Lab or Procedure   Diabetes Screening      HbgA1C   Annually No results found for: A1C    No flowsheet data found.     Fasting Blood Sugar (FSB)Annually Glucose (mg/dL)   Date Value   09/17/2018 104 (H)   10/2 injectable drug abusers     Tetanus Toxoid  Only covered with a cut with metal- TD and TDaP Not covered by Medicare Part B) No vaccine history found This may be covered with your prescription benefits, but Medicare does not cover unless Medically needed

## 2019-10-09 NOTE — PATIENT INSTRUCTIONS
Shivani Matta's SCREENING SCHEDULE   Tests on this list are recommended by your physician but may not be covered, or covered at this frequency, by your insurer. Please check with your insurance carrier before scheduling to verify coverage.     P or any previous visit.  Limited to patients who meet one of the following criteria:   • Men who are 73-68 years old and have smoked more than 100 cigarettes in their lifetime   • Anyone with a family history    Colorectal Cancer Screening Covered up to Age Pneumococcal 13 (Prevnar)  Covered Once after 65 Orders placed or performed in visit on 10/09/19   • PNEUMOCOCCAL VACC, 13 SUAD IM    Please get once after your 65th birthday    Pneumococcal 23 (Pneumovax)  Covered Once after 65 No orders found for this or

## 2019-10-10 DIAGNOSIS — E78.00 ELEVATED CHOLESTEROL: Primary | ICD-10-CM

## 2019-11-15 ENCOUNTER — APPOINTMENT (OUTPATIENT)
Dept: LAB | Age: 64
End: 2019-11-15
Attending: UROLOGY
Payer: MEDICARE

## 2019-11-15 DIAGNOSIS — R97.20 ELEVATED PSA: ICD-10-CM

## 2019-11-15 PROCEDURE — 84153 ASSAY OF PSA TOTAL: CPT

## 2019-11-15 PROCEDURE — 36415 COLL VENOUS BLD VENIPUNCTURE: CPT

## 2020-09-16 ENCOUNTER — LABORATORY ENCOUNTER (OUTPATIENT)
Dept: LAB | Age: 65
End: 2020-09-16
Attending: UROLOGY
Payer: MEDICARE

## 2020-09-16 DIAGNOSIS — E78.00 ELEVATED CHOLESTEROL: ICD-10-CM

## 2020-09-16 DIAGNOSIS — N28.1 COMPLEX RENAL CYST: ICD-10-CM

## 2020-09-16 LAB
BUN BLD-MCNC: 19 MG/DL (ref 7–18)
CHOLEST SMN-MCNC: 219 MG/DL (ref ?–200)
CREAT BLD-MCNC: 1.26 MG/DL (ref 0.7–1.3)
HDLC SERPL-MCNC: 46 MG/DL (ref 40–59)
LDLC SERPL CALC-MCNC: 145 MG/DL (ref ?–100)
NONHDLC SERPL-MCNC: 173 MG/DL (ref ?–130)
PATIENT FASTING Y/N/NP: YES
TRIGL SERPL-MCNC: 138 MG/DL (ref 30–149)
VLDLC SERPL CALC-MCNC: 28 MG/DL (ref 0–30)

## 2020-09-16 PROCEDURE — 80061 LIPID PANEL: CPT

## 2020-09-16 PROCEDURE — 84520 ASSAY OF UREA NITROGEN: CPT

## 2020-09-16 PROCEDURE — 36415 COLL VENOUS BLD VENIPUNCTURE: CPT

## 2020-09-16 PROCEDURE — 82565 ASSAY OF CREATININE: CPT

## 2020-09-18 ENCOUNTER — HOSPITAL ENCOUNTER (OUTPATIENT)
Dept: CT IMAGING | Age: 65
Discharge: HOME | End: 2020-09-18
Attending: UROLOGY
Payer: MEDICARE

## 2020-09-18 DIAGNOSIS — N28.1 COMPLEX RENAL CYST: ICD-10-CM

## 2020-09-18 PROCEDURE — 74178 CT ABD&PLV WO CNTR FLWD CNTR: CPT | Performed by: UROLOGY

## 2020-10-12 ENCOUNTER — IMMUNIZATION (OUTPATIENT)
Dept: INTERNAL MEDICINE CLINIC | Facility: CLINIC | Age: 65
End: 2020-10-12
Payer: MEDICARE

## 2020-10-12 DIAGNOSIS — Z23 NEED FOR VACCINATION: ICD-10-CM

## 2020-10-12 PROCEDURE — G0008 ADMIN INFLUENZA VIRUS VAC: HCPCS | Performed by: INTERNAL MEDICINE

## 2020-10-12 PROCEDURE — 90662 IIV NO PRSV INCREASED AG IM: CPT | Performed by: INTERNAL MEDICINE

## 2020-10-13 ENCOUNTER — APPOINTMENT (OUTPATIENT)
Dept: LAB | Age: 65
End: 2020-10-13
Attending: STUDENT IN AN ORGANIZED HEALTH CARE EDUCATION/TRAINING PROGRAM
Payer: MEDICARE

## 2020-10-13 DIAGNOSIS — Z01.818 PRE-OP TESTING: ICD-10-CM

## 2020-10-16 PROBLEM — Z86.0101 HISTORY OF ADENOMATOUS POLYP OF COLON: Status: ACTIVE | Noted: 2020-10-16

## 2020-10-16 PROBLEM — Z80.0 FAMILY HISTORY OF COLON CANCER: Status: ACTIVE | Noted: 2020-10-16

## 2020-10-16 PROBLEM — K57.90 DIVERTICULOSIS: Status: ACTIVE | Noted: 2020-10-16

## 2020-10-16 PROBLEM — Z86.010 HISTORY OF ADENOMATOUS POLYP OF COLON: Status: ACTIVE | Noted: 2020-10-16

## 2020-10-16 PROBLEM — K63.5 COLON POLYP: Status: ACTIVE | Noted: 2020-10-16

## 2021-03-01 ENCOUNTER — TELEPHONE (OUTPATIENT)
Dept: INTERNAL MEDICINE CLINIC | Facility: CLINIC | Age: 66
End: 2021-03-01

## 2021-03-01 DIAGNOSIS — E78.2 MIXED HYPERLIPIDEMIA: ICD-10-CM

## 2021-03-01 DIAGNOSIS — Z12.5 SCREENING PSA (PROSTATE SPECIFIC ANTIGEN): ICD-10-CM

## 2021-03-01 DIAGNOSIS — N28.1 RENAL CYST, LEFT: ICD-10-CM

## 2021-03-01 DIAGNOSIS — Z00.00 ROUTINE GENERAL MEDICAL EXAMINATION AT A HEALTH CARE FACILITY: Primary | ICD-10-CM

## 2021-03-01 NOTE — TELEPHONE ENCOUNTER
Future Appointments   Date Time Provider Sony Granados   5/12/2021  6:15 PM Trini Kothari MD [facility]     Orders to edward-  Pt informed that labs need to be completed no sooner than 2 weeks prior to the appt.  Pt aware to fast-no call ba

## 2021-03-03 DIAGNOSIS — Z23 NEED FOR VACCINATION: ICD-10-CM

## 2021-03-06 ENCOUNTER — IMMUNIZATION (OUTPATIENT)
Dept: LAB | Age: 66
End: 2021-03-06
Attending: HOSPITALIST
Payer: MEDICARE

## 2021-03-06 DIAGNOSIS — Z23 NEED FOR VACCINATION: Primary | ICD-10-CM

## 2021-03-06 PROCEDURE — 0001A SARSCOV2 VAC 30MCG/0.3ML IM: CPT

## 2021-03-27 ENCOUNTER — IMMUNIZATION (OUTPATIENT)
Dept: LAB | Age: 66
End: 2021-03-27
Attending: HOSPITALIST
Payer: MEDICARE

## 2021-03-27 DIAGNOSIS — Z23 NEED FOR VACCINATION: Primary | ICD-10-CM

## 2021-03-27 PROCEDURE — 0002A SARSCOV2 VAC 30MCG/0.3ML IM: CPT

## 2021-06-28 ENCOUNTER — LAB ENCOUNTER (OUTPATIENT)
Dept: LAB | Age: 66
End: 2021-06-28
Attending: INTERNAL MEDICINE
Payer: MEDICARE

## 2021-06-28 DIAGNOSIS — Z00.00 ROUTINE GENERAL MEDICAL EXAMINATION AT A HEALTH CARE FACILITY: ICD-10-CM

## 2021-06-28 DIAGNOSIS — N28.1 RENAL CYST, LEFT: ICD-10-CM

## 2021-06-28 DIAGNOSIS — E78.2 MIXED HYPERLIPIDEMIA: ICD-10-CM

## 2021-06-28 LAB
ALBUMIN SERPL-MCNC: 3.4 G/DL (ref 3.4–5)
ALBUMIN/GLOB SERPL: 1.2 {RATIO} (ref 1–2)
ALP LIVER SERPL-CCNC: 69 U/L
ALT SERPL-CCNC: 25 U/L
ANION GAP SERPL CALC-SCNC: 4 MMOL/L (ref 0–18)
AST SERPL-CCNC: 13 U/L (ref 15–37)
BASOPHILS # BLD AUTO: 0.03 X10(3) UL (ref 0–0.2)
BASOPHILS NFR BLD AUTO: 0.6 %
BILIRUB SERPL-MCNC: 0.4 MG/DL (ref 0.1–2)
BUN BLD-MCNC: 17 MG/DL (ref 7–18)
BUN/CREAT SERPL: 15.5 (ref 10–20)
CALCIUM BLD-MCNC: 8.3 MG/DL (ref 8.5–10.1)
CHLORIDE SERPL-SCNC: 108 MMOL/L (ref 98–112)
CHOLEST SMN-MCNC: 210 MG/DL (ref ?–200)
CO2 SERPL-SCNC: 28 MMOL/L (ref 21–32)
CREAT BLD-MCNC: 1.1 MG/DL
DEPRECATED RDW RBC AUTO: 42.2 FL (ref 35.1–46.3)
EOSINOPHIL # BLD AUTO: 0.15 X10(3) UL (ref 0–0.7)
EOSINOPHIL NFR BLD AUTO: 3.1 %
ERYTHROCYTE [DISTWIDTH] IN BLOOD BY AUTOMATED COUNT: 12.5 % (ref 11–15)
GLOBULIN PLAS-MCNC: 2.8 G/DL (ref 2.8–4.4)
GLUCOSE BLD-MCNC: 77 MG/DL (ref 70–99)
HCT VFR BLD AUTO: 47.6 %
HDLC SERPL-MCNC: 44 MG/DL (ref 40–59)
HGB BLD-MCNC: 15.9 G/DL
IMM GRANULOCYTES # BLD AUTO: 0.01 X10(3) UL (ref 0–1)
IMM GRANULOCYTES NFR BLD: 0.2 %
LDLC SERPL CALC-MCNC: 131 MG/DL (ref ?–100)
LYMPHOCYTES # BLD AUTO: 1.78 X10(3) UL (ref 1–4)
LYMPHOCYTES NFR BLD AUTO: 36.3 %
M PROTEIN MFR SERPL ELPH: 6.2 G/DL (ref 6.4–8.2)
MCH RBC QN AUTO: 30.6 PG (ref 26–34)
MCHC RBC AUTO-ENTMCNC: 33.4 G/DL (ref 31–37)
MCV RBC AUTO: 91.7 FL
MONOCYTES # BLD AUTO: 0.45 X10(3) UL (ref 0.1–1)
MONOCYTES NFR BLD AUTO: 9.2 %
NEUTROPHILS # BLD AUTO: 2.49 X10 (3) UL (ref 1.5–7.7)
NEUTROPHILS # BLD AUTO: 2.49 X10(3) UL (ref 1.5–7.7)
NEUTROPHILS NFR BLD AUTO: 50.6 %
NONHDLC SERPL-MCNC: 166 MG/DL (ref ?–130)
OSMOLALITY SERPL CALC.SUM OF ELEC: 290 MOSM/KG (ref 275–295)
PATIENT FASTING Y/N/NP: YES
PATIENT FASTING Y/N/NP: YES
PLATELET # BLD AUTO: 191 10(3)UL (ref 150–450)
POTASSIUM SERPL-SCNC: 4 MMOL/L (ref 3.5–5.1)
RBC # BLD AUTO: 5.19 X10(6)UL
SODIUM SERPL-SCNC: 140 MMOL/L (ref 136–145)
TRIGL SERPL-MCNC: 198 MG/DL (ref 30–149)
TSI SER-ACNC: 0.73 MIU/ML (ref 0.36–3.74)
VLDLC SERPL CALC-MCNC: 36 MG/DL (ref 0–30)
WBC # BLD AUTO: 4.9 X10(3) UL (ref 4–11)

## 2021-06-28 PROCEDURE — 85025 COMPLETE CBC W/AUTO DIFF WBC: CPT

## 2021-06-28 PROCEDURE — 80061 LIPID PANEL: CPT

## 2021-06-28 PROCEDURE — 36415 COLL VENOUS BLD VENIPUNCTURE: CPT

## 2021-06-28 PROCEDURE — 84443 ASSAY THYROID STIM HORMONE: CPT

## 2021-06-28 PROCEDURE — 80053 COMPREHEN METABOLIC PANEL: CPT

## 2021-07-02 ENCOUNTER — OFFICE VISIT (OUTPATIENT)
Dept: INTERNAL MEDICINE CLINIC | Facility: CLINIC | Age: 66
End: 2021-07-02
Payer: MEDICARE

## 2021-07-02 VITALS
HEIGHT: 74 IN | WEIGHT: 280.63 LBS | SYSTOLIC BLOOD PRESSURE: 116 MMHG | BODY MASS INDEX: 36.01 KG/M2 | RESPIRATION RATE: 18 BRPM | DIASTOLIC BLOOD PRESSURE: 80 MMHG

## 2021-07-02 DIAGNOSIS — M17.12 PRIMARY OSTEOARTHRITIS OF LEFT KNEE: ICD-10-CM

## 2021-07-02 DIAGNOSIS — Z00.00 ENCOUNTER FOR ANNUAL HEALTH EXAMINATION: ICD-10-CM

## 2021-07-02 DIAGNOSIS — Z86.010 PERSONAL HISTORY OF COLONIC POLYPS: ICD-10-CM

## 2021-07-02 DIAGNOSIS — E78.2 MIXED HYPERLIPIDEMIA: Primary | ICD-10-CM

## 2021-07-02 DIAGNOSIS — N28.1 RENAL CYST, LEFT: ICD-10-CM

## 2021-07-02 DIAGNOSIS — Z80.0 FAMILY HISTORY OF COLON CANCER: ICD-10-CM

## 2021-07-02 DIAGNOSIS — K57.30 DIVERTICULOSIS OF LARGE INTESTINE WITHOUT PERFORATION OR ABSCESS WITHOUT BLEEDING: ICD-10-CM

## 2021-07-02 DIAGNOSIS — D36.9 TUBULAR ADENOMA: ICD-10-CM

## 2021-07-02 DIAGNOSIS — G47.33 OSA (OBSTRUCTIVE SLEEP APNEA): ICD-10-CM

## 2021-07-02 PROBLEM — K63.5 POLYP OF COLON: Status: RESOLVED | Noted: 2018-10-08 | Resolved: 2021-07-02

## 2021-07-02 PROBLEM — K64.8 OTHER HEMORRHOIDS: Status: RESOLVED | Noted: 2018-10-08 | Resolved: 2021-07-02

## 2021-07-02 PROBLEM — D03.72 MELANOMA IN SITU OF LEFT LOWER EXTREMITY (HCC): Status: RESOLVED | Noted: 2018-12-12 | Resolved: 2021-07-02

## 2021-07-02 PROCEDURE — G0009 ADMIN PNEUMOCOCCAL VACCINE: HCPCS | Performed by: INTERNAL MEDICINE

## 2021-07-02 PROCEDURE — 90732 PPSV23 VACC 2 YRS+ SUBQ/IM: CPT | Performed by: INTERNAL MEDICINE

## 2021-07-02 PROCEDURE — G0438 PPPS, INITIAL VISIT: HCPCS | Performed by: INTERNAL MEDICINE

## 2021-07-02 NOTE — PATIENT INSTRUCTIONS
Richard Matta's SCREENING SCHEDULE   Tests on this list are recommended by your physician but may not be covered, or covered at this frequency, by your insurer. Please check with your insurance carrier before scheduling to verify coverage. 10/12/2020  No recommendations at this time    Pneumococcal Each vaccine ([id number] & [id number]) covered once after 65 Prevnar 13: 10/09/2019    [id number]: -     Pneumococcal Vaccination(2 of 2 - PPSV23) due on 10/09/2020    Hepatitis B One screening c

## 2021-07-02 NOTE — PROGRESS NOTES
HPI:   Olvin Hendrix is a 76year old male who presents for a Medicare Subsequent Annual Wellness visit (Pt already had Initial Annual Wellness). Here for awv. Stable chronic issues. Feels well.    His last annual assessment has been over 1 (obstructive sleep apnea) / PFT's [10/13] Normal / Rx Oral Appliance [3/14] [address]     Renal cyst x 2 // Lt --> 1.3 cm complex; / Rt --> 2.1 cm simple / Dx [10/13] & [11/15] by U/S     Tubular adenoma     Osteoarthritis of left knee impairment, and Wears glasses. He  has a past surgical history that includes tonsillectomy; hernia surgery (9-16-13 [facility]); colonoscopy (11/13/2013); colonoscopy; and knee replacement surgery (Left knee joint).     His family history includes Colon C rhythm, S1, S2 normal, no murmur, rub or gallop   Abdomen:   Soft, non-tender, bowel sounds active all four quadrants,  no masses, no organomegaly   Genitalia: Normal male   Rectal: Normal tone, normal prostate, no masses or tenderness   Extremities: Extre bleeding  Stable contien observation  Primary osteoarthritis of left knee  Stable continue observation  Renal cyst x 2 // Lt --> 1.3 cm complex; / Rt --> 2.1 cm simple / Dx [10/13] & [11/15] by U/S  Stable continue observation  Tubular adenoma  Stable cont Lab Results   Component Value Date    CHOLEST 210 (H) 06/28/2021    HDL 44 06/28/2021     (H) 06/28/2021    TRIG 198 (H) 06/28/2021         Electrocardiogram (EKG)   Covered if needed at Welcome to Medicare, and non-screening if indicated for medica

## 2021-10-07 ENCOUNTER — IMMUNIZATION (OUTPATIENT)
Dept: INTERNAL MEDICINE CLINIC | Facility: CLINIC | Age: 66
End: 2021-10-07
Payer: MEDICARE

## 2021-10-07 DIAGNOSIS — Z23 NEED FOR VACCINATION: Primary | ICD-10-CM

## 2021-10-07 PROCEDURE — G0008 ADMIN INFLUENZA VIRUS VAC: HCPCS | Performed by: INTERNAL MEDICINE

## 2021-10-07 PROCEDURE — 90662 IIV NO PRSV INCREASED AG IM: CPT | Performed by: INTERNAL MEDICINE

## 2021-10-28 ENCOUNTER — IMMUNIZATION (OUTPATIENT)
Dept: LAB | Facility: HOSPITAL | Age: 66
End: 2021-10-28
Attending: EMERGENCY MEDICINE
Payer: MEDICARE

## 2021-10-28 DIAGNOSIS — Z23 NEED FOR VACCINATION: Primary | ICD-10-CM

## 2021-10-28 PROCEDURE — 0004A SARSCOV2 VAC 30MCG/0.3ML IM: CPT

## 2022-07-05 ENCOUNTER — TELEPHONE (OUTPATIENT)
Dept: INTERNAL MEDICINE CLINIC | Facility: CLINIC | Age: 67
End: 2022-07-05

## 2022-07-05 NOTE — TELEPHONE ENCOUNTER
Orders to Get Mak aware must fast no call back required  Future Appointments   Date Time Provider Sony Granados   8/5/2022  8:15 AM Shane Garcia MD [facility]   10/5/2022  6:30 PM Mohan Kiser MD [facility]

## 2022-08-04 ENCOUNTER — IMMUNIZATION (OUTPATIENT)
Dept: LAB | Age: 67
End: 2022-08-04
Attending: EMERGENCY MEDICINE
Payer: MEDICARE

## 2022-08-04 DIAGNOSIS — Z23 NEED FOR VACCINATION: Primary | ICD-10-CM

## 2022-08-04 PROCEDURE — 0054A SARSCOV2 VAC 30MCG TRS SUCR: CPT

## 2022-08-05 ENCOUNTER — OFFICE VISIT (OUTPATIENT)
Dept: INTERNAL MEDICINE CLINIC | Facility: CLINIC | Age: 67
End: 2022-08-05
Payer: MEDICARE

## 2022-08-05 VITALS
SYSTOLIC BLOOD PRESSURE: 106 MMHG | OXYGEN SATURATION: 97 % | WEIGHT: 280 LBS | DIASTOLIC BLOOD PRESSURE: 68 MMHG | BODY MASS INDEX: 35.94 KG/M2 | TEMPERATURE: 99 F | HEIGHT: 73.82 IN | HEART RATE: 88 BPM

## 2022-08-05 DIAGNOSIS — K57.30 DIVERTICULOSIS OF LARGE INTESTINE WITHOUT PERFORATION OR ABSCESS WITHOUT BLEEDING: ICD-10-CM

## 2022-08-05 DIAGNOSIS — G47.33 OSA (OBSTRUCTIVE SLEEP APNEA): ICD-10-CM

## 2022-08-05 DIAGNOSIS — Z12.5 SCREENING FOR PROSTATE CANCER: ICD-10-CM

## 2022-08-05 DIAGNOSIS — Z80.0 FAMILY HISTORY OF COLON CANCER: ICD-10-CM

## 2022-08-05 DIAGNOSIS — I83.90 VARICOSE VEINS: ICD-10-CM

## 2022-08-05 DIAGNOSIS — M17.12 PRIMARY OSTEOARTHRITIS OF LEFT KNEE: ICD-10-CM

## 2022-08-05 DIAGNOSIS — N28.1 RENAL CYST, LEFT: ICD-10-CM

## 2022-08-05 DIAGNOSIS — E78.2 MIXED HYPERLIPIDEMIA: ICD-10-CM

## 2022-08-05 DIAGNOSIS — Z00.00 ENCOUNTER FOR ANNUAL HEALTH EXAMINATION: ICD-10-CM

## 2022-08-05 DIAGNOSIS — Z00.00 PE (PHYSICAL EXAM), ANNUAL: Primary | ICD-10-CM

## 2022-08-05 DIAGNOSIS — E66.01 MORBID (SEVERE) OBESITY DUE TO EXCESS CALORIES (HCC): ICD-10-CM

## 2022-08-05 DIAGNOSIS — Z86.010 PERSONAL HISTORY OF COLONIC POLYPS: ICD-10-CM

## 2022-08-05 PROCEDURE — G0439 PPPS, SUBSEQ VISIT: HCPCS | Performed by: INTERNAL MEDICINE

## 2022-08-05 PROCEDURE — 1126F AMNT PAIN NOTED NONE PRSNT: CPT | Performed by: INTERNAL MEDICINE

## 2022-08-05 RX ORDER — LORATADINE 10 MG/1
1 TABLET ORAL DAILY
COMMUNITY
Start: 2021-01-01

## 2022-10-13 ENCOUNTER — NURSE ONLY (OUTPATIENT)
Dept: INTERNAL MEDICINE CLINIC | Facility: CLINIC | Age: 67
End: 2022-10-13
Payer: MEDICARE

## 2022-10-13 DIAGNOSIS — Z23 NEED FOR VACCINATION: Primary | ICD-10-CM

## 2022-10-13 PROCEDURE — G0008 ADMIN INFLUENZA VIRUS VAC: HCPCS | Performed by: INTERNAL MEDICINE

## 2022-10-13 PROCEDURE — 90662 IIV NO PRSV INCREASED AG IM: CPT | Performed by: INTERNAL MEDICINE

## 2022-11-21 ENCOUNTER — LAB ENCOUNTER (OUTPATIENT)
Dept: LAB | Age: 67
End: 2022-11-21
Attending: INTERNAL MEDICINE
Payer: MEDICARE

## 2022-11-21 DIAGNOSIS — G47.33 OSA (OBSTRUCTIVE SLEEP APNEA): ICD-10-CM

## 2022-11-21 DIAGNOSIS — E78.2 MIXED HYPERLIPIDEMIA: ICD-10-CM

## 2022-11-21 DIAGNOSIS — Z12.5 SCREENING FOR PROSTATE CANCER: ICD-10-CM

## 2022-11-21 LAB
ALBUMIN SERPL-MCNC: 3.6 G/DL (ref 3.4–5)
ALBUMIN/GLOB SERPL: 1.4 {RATIO} (ref 1–2)
ALP LIVER SERPL-CCNC: 74 U/L
ALT SERPL-CCNC: 25 U/L
ANION GAP SERPL CALC-SCNC: 3 MMOL/L (ref 0–18)
AST SERPL-CCNC: 23 U/L (ref 15–37)
BASOPHILS # BLD AUTO: 0.03 X10(3) UL (ref 0–0.2)
BASOPHILS NFR BLD AUTO: 0.6 %
BILIRUB SERPL-MCNC: 0.6 MG/DL (ref 0.1–2)
BUN BLD-MCNC: 16 MG/DL (ref 7–18)
CALCIUM BLD-MCNC: 9 MG/DL (ref 8.5–10.1)
CHLORIDE SERPL-SCNC: 109 MMOL/L (ref 98–112)
CHOLEST SERPL-MCNC: 216 MG/DL (ref ?–200)
CO2 SERPL-SCNC: 30 MMOL/L (ref 21–32)
COMPLEXED PSA SERPL-MCNC: 4.79 NG/ML (ref ?–4)
CREAT BLD-MCNC: 1.24 MG/DL
EOSINOPHIL # BLD AUTO: 0.15 X10(3) UL (ref 0–0.7)
EOSINOPHIL NFR BLD AUTO: 3.1 %
ERYTHROCYTE [DISTWIDTH] IN BLOOD BY AUTOMATED COUNT: 12.7 %
FASTING PATIENT LIPID ANSWER: YES
FASTING STATUS PATIENT QL REPORTED: YES
GFR SERPLBLD BASED ON 1.73 SQ M-ARVRAT: 63 ML/MIN/1.73M2 (ref 60–?)
GLOBULIN PLAS-MCNC: 2.6 G/DL (ref 2.8–4.4)
GLUCOSE BLD-MCNC: 89 MG/DL (ref 70–99)
HCT VFR BLD AUTO: 47 %
HDLC SERPL-MCNC: 47 MG/DL (ref 40–59)
HGB BLD-MCNC: 15.5 G/DL
IMM GRANULOCYTES # BLD AUTO: 0.01 X10(3) UL (ref 0–1)
IMM GRANULOCYTES NFR BLD: 0.2 %
LDLC SERPL CALC-MCNC: 145 MG/DL (ref ?–100)
LYMPHOCYTES # BLD AUTO: 1.79 X10(3) UL (ref 1–4)
LYMPHOCYTES NFR BLD AUTO: 36.7 %
MCH RBC QN AUTO: 31.1 PG (ref 26–34)
MCHC RBC AUTO-ENTMCNC: 33 G/DL (ref 31–37)
MCV RBC AUTO: 94.4 FL
MONOCYTES # BLD AUTO: 0.54 X10(3) UL (ref 0.1–1)
MONOCYTES NFR BLD AUTO: 11.1 %
NEUTROPHILS # BLD AUTO: 2.36 X10 (3) UL (ref 1.5–7.7)
NEUTROPHILS # BLD AUTO: 2.36 X10(3) UL (ref 1.5–7.7)
NEUTROPHILS NFR BLD AUTO: 48.3 %
NONHDLC SERPL-MCNC: 169 MG/DL (ref ?–130)
OSMOLALITY SERPL CALC.SUM OF ELEC: 295 MOSM/KG (ref 275–295)
PLATELET # BLD AUTO: 202 10(3)UL (ref 150–450)
POTASSIUM SERPL-SCNC: 4.5 MMOL/L (ref 3.5–5.1)
PROT SERPL-MCNC: 6.2 G/DL (ref 6.4–8.2)
RBC # BLD AUTO: 4.98 X10(6)UL
SODIUM SERPL-SCNC: 142 MMOL/L (ref 136–145)
TRIGL SERPL-MCNC: 132 MG/DL (ref 30–149)
TSI SER-ACNC: 0.69 MIU/ML (ref 0.36–3.74)
VLDLC SERPL CALC-MCNC: 25 MG/DL (ref 0–30)
WBC # BLD AUTO: 4.9 X10(3) UL (ref 4–11)

## 2022-11-21 PROCEDURE — 80053 COMPREHEN METABOLIC PANEL: CPT

## 2022-11-21 PROCEDURE — 36415 COLL VENOUS BLD VENIPUNCTURE: CPT

## 2022-11-21 PROCEDURE — 85025 COMPLETE CBC W/AUTO DIFF WBC: CPT

## 2022-11-21 PROCEDURE — 84443 ASSAY THYROID STIM HORMONE: CPT

## 2022-11-21 PROCEDURE — 80061 LIPID PANEL: CPT

## 2023-02-02 ENCOUNTER — TELEMEDICINE (OUTPATIENT)
Dept: INTERNAL MEDICINE CLINIC | Facility: CLINIC | Age: 68
End: 2023-02-02
Payer: MEDICARE

## 2023-02-02 DIAGNOSIS — U07.1 COVID: Primary | ICD-10-CM

## 2023-02-02 PROCEDURE — 99213 OFFICE O/P EST LOW 20 MIN: CPT | Performed by: NURSE PRACTITIONER

## 2023-02-02 RX ORDER — ALBUTEROL SULFATE 90 UG/1
2 AEROSOL, METERED RESPIRATORY (INHALATION) EVERY 6 HOURS PRN
Qty: 1 EACH | Refills: 3 | Status: SHIPPED | OUTPATIENT
Start: 2023-02-02

## 2023-03-08 ENCOUNTER — OFFICE VISIT (OUTPATIENT)
Dept: INTERNAL MEDICINE CLINIC | Facility: CLINIC | Age: 68
End: 2023-03-08
Payer: MEDICARE

## 2023-03-08 ENCOUNTER — HOSPITAL ENCOUNTER (OUTPATIENT)
Dept: GENERAL RADIOLOGY | Age: 68
Discharge: HOME | End: 2023-03-08
Attending: NURSE PRACTITIONER
Payer: MEDICARE

## 2023-03-08 VITALS
OXYGEN SATURATION: 96 % | HEART RATE: 52 BPM | DIASTOLIC BLOOD PRESSURE: 64 MMHG | SYSTOLIC BLOOD PRESSURE: 110 MMHG | RESPIRATION RATE: 18 BRPM

## 2023-03-08 DIAGNOSIS — R07.89 CHEST TIGHTNESS: ICD-10-CM

## 2023-03-08 DIAGNOSIS — Z86.16 HISTORY OF COVID-19: Primary | ICD-10-CM

## 2023-03-08 DIAGNOSIS — G47.33 OSA (OBSTRUCTIVE SLEEP APNEA): ICD-10-CM

## 2023-03-08 DIAGNOSIS — Z86.16 HISTORY OF COVID-19: ICD-10-CM

## 2023-03-08 DIAGNOSIS — R97.20 ELEVATED PSA: ICD-10-CM

## 2023-03-08 PROCEDURE — 99214 OFFICE O/P EST MOD 30 MIN: CPT | Performed by: NURSE PRACTITIONER

## 2023-03-08 PROCEDURE — 71046 X-RAY EXAM CHEST 2 VIEWS: CPT | Performed by: NURSE PRACTITIONER

## 2023-03-09 ENCOUNTER — TELEPHONE (OUTPATIENT)
Dept: INTERNAL MEDICINE CLINIC | Facility: CLINIC | Age: 68
End: 2023-03-09

## 2023-03-09 NOTE — TELEPHONE ENCOUNTER
Records request from dental sleep study .  Stat request was faxed to [phone] original was sent to scan stat for further processing and a copy to scanning

## 2023-03-13 ENCOUNTER — TELEPHONE (OUTPATIENT)
Dept: INTERNAL MEDICINE CLINIC | Facility: CLINIC | Age: 68
End: 2023-03-13

## 2023-03-13 NOTE — TELEPHONE ENCOUNTER
Pt's wife Clay Sam on HIPAA indicates pt is doing better, was using an  inhaler, started his Claritin. Wife notified for pt to call with persistent symptoms. Wife verbalizes understanding.

## 2023-03-30 ENCOUNTER — HOSPITAL ENCOUNTER (OUTPATIENT)
Dept: MRI IMAGING | Facility: HOSPITAL | Age: 68
Discharge: HOME | End: 2023-03-30
Attending: UROLOGY
Payer: MEDICARE

## 2023-03-30 DIAGNOSIS — R97.20 ELEVATED PSA: ICD-10-CM

## 2023-03-30 PROCEDURE — 72197 MRI PELVIS W/O & W/DYE: CPT | Performed by: UROLOGY

## 2023-03-30 PROCEDURE — A9575 INJ GADOTERATE MEGLUMI 0.1ML: HCPCS

## 2023-03-30 RX ORDER — GADOTERATE MEGLUMINE 376.9 MG/ML
20 INJECTION INTRAVENOUS
Status: COMPLETED | OUTPATIENT
Start: 2023-03-30 | End: 2023-03-30

## 2023-03-30 RX ADMIN — GADOTERATE MEGLUMINE 20 ML: 376.9 INJECTION INTRAVENOUS at 08:10:00

## 2023-04-04 ENCOUNTER — TELEPHONE (OUTPATIENT)
Dept: INTERNAL MEDICINE CLINIC | Facility: CLINIC | Age: 68
End: 2023-04-04

## 2023-04-11 NOTE — TELEPHONE ENCOUNTER
Charley calling to check on status of for Oral Appliance Therapy. Unable to locate document in AS bin and have requested it be refaxed to the office.

## 2023-05-17 ENCOUNTER — LAB ENCOUNTER (OUTPATIENT)
Dept: LAB | Age: 68
End: 2023-05-17
Attending: UROLOGY
Payer: MEDICARE

## 2023-05-17 ENCOUNTER — OFFICE VISIT (OUTPATIENT)
Dept: SURGERY | Facility: CLINIC | Age: 68
End: 2023-05-17

## 2023-05-17 DIAGNOSIS — N13.8 BPH WITH OBSTRUCTION/LOWER URINARY TRACT SYMPTOMS: ICD-10-CM

## 2023-05-17 DIAGNOSIS — N40.1 BPH WITH OBSTRUCTION/LOWER URINARY TRACT SYMPTOMS: ICD-10-CM

## 2023-05-17 DIAGNOSIS — R97.20 ELEVATED PSA: Primary | ICD-10-CM

## 2023-05-17 DIAGNOSIS — N28.1 RENAL CYST: ICD-10-CM

## 2023-05-17 LAB
APPEARANCE: CLEAR
BILIRUBIN: NEGATIVE
GLUCOSE (URINE DIPSTICK): NEGATIVE MG/DL
KETONES (URINE DIPSTICK): NEGATIVE MG/DL
LEUKOCYTES: NEGATIVE
MULTISTIX LOT#: ABNORMAL NUMERIC
NITRITE, URINE: NEGATIVE
PH, URINE: 6.5 (ref 4.5–8)
PROTEIN (URINE DIPSTICK): NEGATIVE MG/DL
SPECIFIC GRAVITY: 1.01 (ref 1–1.03)
URINE-COLOR: YELLOW
UROBILINOGEN,SEMI-QN: 0.2 MG/DL (ref 0–1.9)

## 2023-05-17 PROCEDURE — 81003 URINALYSIS AUTO W/O SCOPE: CPT | Performed by: UROLOGY

## 2023-05-17 PROCEDURE — 36415 COLL VENOUS BLD VENIPUNCTURE: CPT

## 2023-05-17 PROCEDURE — 99204 OFFICE O/P NEW MOD 45 MIN: CPT | Performed by: UROLOGY

## 2023-05-22 ENCOUNTER — TELEPHONE (OUTPATIENT)
Dept: SURGERY | Facility: CLINIC | Age: 68
End: 2023-05-22

## 2023-05-22 DIAGNOSIS — R97.20 ELEVATED PSA: Primary | ICD-10-CM

## 2023-05-22 NOTE — TELEPHONE ENCOUNTER
Please call this patient or their family and schedule the patient for a follow up with me in 1 y with PSA prior - alternatively if he wants to see me sooner you can do 6 mo. Thanks,    MPH      Below if for Documentation Purposes Only:  4K 18.3%, repeat PSA down to 4.67. Left VM with results.

## 2023-07-24 ENCOUNTER — TELEPHONE (OUTPATIENT)
Dept: INTERNAL MEDICINE CLINIC | Facility: CLINIC | Age: 68
End: 2023-07-24

## 2023-07-24 DIAGNOSIS — E78.2 MIXED HYPERLIPIDEMIA: ICD-10-CM

## 2023-07-24 DIAGNOSIS — Z00.00 ROUTINE GENERAL MEDICAL EXAMINATION AT A HEALTH CARE FACILITY: Primary | ICD-10-CM

## 2023-07-24 NOTE — TELEPHONE ENCOUNTER
Future Appointments   Date Time Provider Sony Roberta   9/8/2023  8:30 AM Segundo Bruno MD [facility]   10/6/2023  7:00 AM Jamel Morel MD [facility]   11/17/2023 10:15 AM Leonides Banegas MD [facility]     Orders to edward- Pt informed that labs need to be completed no sooner than 2 weeks prior to the appt.  Pt aware to fast-no call back required

## 2023-09-28 ENCOUNTER — LAB ENCOUNTER (OUTPATIENT)
Dept: LAB | Age: 68
End: 2023-09-28
Attending: INTERNAL MEDICINE
Payer: MEDICARE

## 2023-09-28 DIAGNOSIS — Z00.00 ROUTINE GENERAL MEDICAL EXAMINATION AT A HEALTH CARE FACILITY: ICD-10-CM

## 2023-09-28 DIAGNOSIS — E78.2 MIXED HYPERLIPIDEMIA: ICD-10-CM

## 2023-09-28 LAB
ALBUMIN SERPL-MCNC: 3.5 G/DL (ref 3.4–5)
ALBUMIN/GLOB SERPL: 1.2 {RATIO} (ref 1–2)
ALP LIVER SERPL-CCNC: 83 U/L
ALT SERPL-CCNC: 31 U/L
ANION GAP SERPL CALC-SCNC: 5 MMOL/L (ref 0–18)
AST SERPL-CCNC: 22 U/L (ref 15–37)
BASOPHILS # BLD AUTO: 0.02 X10(3) UL (ref 0–0.2)
BASOPHILS NFR BLD AUTO: 0.4 %
BILIRUB SERPL-MCNC: 0.5 MG/DL (ref 0.1–2)
BUN BLD-MCNC: 17 MG/DL (ref 7–18)
CALCIUM BLD-MCNC: 8.6 MG/DL (ref 8.5–10.1)
CHLORIDE SERPL-SCNC: 110 MMOL/L (ref 98–112)
CHOLEST SERPL-MCNC: 208 MG/DL (ref ?–200)
CO2 SERPL-SCNC: 27 MMOL/L (ref 21–32)
CREAT BLD-MCNC: 1.29 MG/DL
EGFRCR SERPLBLD CKD-EPI 2021: 60 ML/MIN/1.73M2 (ref 60–?)
EOSINOPHIL # BLD AUTO: 0.1 X10(3) UL (ref 0–0.7)
EOSINOPHIL NFR BLD AUTO: 2.2 %
ERYTHROCYTE [DISTWIDTH] IN BLOOD BY AUTOMATED COUNT: 12.7 %
FASTING PATIENT LIPID ANSWER: YES
FASTING STATUS PATIENT QL REPORTED: YES
GLOBULIN PLAS-MCNC: 2.9 G/DL (ref 2.8–4.4)
GLUCOSE BLD-MCNC: 94 MG/DL (ref 70–99)
HCT VFR BLD AUTO: 47.2 %
HDLC SERPL-MCNC: 50 MG/DL (ref 40–59)
HGB BLD-MCNC: 15.8 G/DL
IMM GRANULOCYTES # BLD AUTO: 0.01 X10(3) UL (ref 0–1)
IMM GRANULOCYTES NFR BLD: 0.2 %
LDLC SERPL CALC-MCNC: 142 MG/DL (ref ?–100)
LYMPHOCYTES # BLD AUTO: 1.45 X10(3) UL (ref 1–4)
LYMPHOCYTES NFR BLD AUTO: 31.3 %
MCH RBC QN AUTO: 30.4 PG (ref 26–34)
MCHC RBC AUTO-ENTMCNC: 33.5 G/DL (ref 31–37)
MCV RBC AUTO: 90.8 FL
MONOCYTES # BLD AUTO: 0.5 X10(3) UL (ref 0.1–1)
MONOCYTES NFR BLD AUTO: 10.8 %
NEUTROPHILS # BLD AUTO: 2.56 X10 (3) UL (ref 1.5–7.7)
NEUTROPHILS # BLD AUTO: 2.56 X10(3) UL (ref 1.5–7.7)
NEUTROPHILS NFR BLD AUTO: 55.1 %
NONHDLC SERPL-MCNC: 158 MG/DL (ref ?–130)
OSMOLALITY SERPL CALC.SUM OF ELEC: 295 MOSM/KG (ref 275–295)
PLATELET # BLD AUTO: 199 10(3)UL (ref 150–450)
POTASSIUM SERPL-SCNC: 4.1 MMOL/L (ref 3.5–5.1)
PROT SERPL-MCNC: 6.4 G/DL (ref 6.4–8.2)
RBC # BLD AUTO: 5.2 X10(6)UL
SODIUM SERPL-SCNC: 142 MMOL/L (ref 136–145)
TRIGL SERPL-MCNC: 88 MG/DL (ref 30–149)
TSI SER-ACNC: 0.8 MIU/ML (ref 0.36–3.74)
VLDLC SERPL CALC-MCNC: 16 MG/DL (ref 0–30)
WBC # BLD AUTO: 4.6 X10(3) UL (ref 4–11)

## 2023-09-28 PROCEDURE — 80053 COMPREHEN METABOLIC PANEL: CPT

## 2023-09-28 PROCEDURE — 85025 COMPLETE CBC W/AUTO DIFF WBC: CPT

## 2023-09-28 PROCEDURE — 84443 ASSAY THYROID STIM HORMONE: CPT

## 2023-09-28 PROCEDURE — 36415 COLL VENOUS BLD VENIPUNCTURE: CPT

## 2023-09-28 PROCEDURE — 80061 LIPID PANEL: CPT

## 2023-10-06 ENCOUNTER — OFFICE VISIT (OUTPATIENT)
Dept: INTERNAL MEDICINE CLINIC | Facility: CLINIC | Age: 68
End: 2023-10-06
Payer: MEDICARE

## 2023-10-06 VITALS
HEART RATE: 65 BPM | RESPIRATION RATE: 17 BRPM | OXYGEN SATURATION: 95 % | HEIGHT: 73 IN | TEMPERATURE: 97 F | WEIGHT: 281 LBS | SYSTOLIC BLOOD PRESSURE: 122 MMHG | BODY MASS INDEX: 37.24 KG/M2 | DIASTOLIC BLOOD PRESSURE: 70 MMHG

## 2023-10-06 DIAGNOSIS — R97.20 ELEVATED PSA: ICD-10-CM

## 2023-10-06 DIAGNOSIS — E66.01 MORBID (SEVERE) OBESITY DUE TO EXCESS CALORIES (HCC): ICD-10-CM

## 2023-10-06 DIAGNOSIS — E78.2 MIXED HYPERLIPIDEMIA: Primary | ICD-10-CM

## 2023-10-06 DIAGNOSIS — G47.33 OSA (OBSTRUCTIVE SLEEP APNEA): ICD-10-CM

## 2023-10-06 DIAGNOSIS — N28.1 RENAL CYST, LEFT: ICD-10-CM

## 2023-10-06 DIAGNOSIS — M17.12 PRIMARY OSTEOARTHRITIS OF LEFT KNEE: ICD-10-CM

## 2023-10-06 DIAGNOSIS — Z80.0 FAMILY HISTORY OF COLON CANCER: ICD-10-CM

## 2023-10-06 DIAGNOSIS — Z86.010 PERSONAL HISTORY OF COLONIC POLYPS: ICD-10-CM

## 2023-10-06 DIAGNOSIS — Z00.00 ENCOUNTER FOR ANNUAL HEALTH EXAMINATION: ICD-10-CM

## 2023-10-06 DIAGNOSIS — K57.30 DIVERTICULOSIS OF LARGE INTESTINE WITHOUT PERFORATION OR ABSCESS WITHOUT BLEEDING: ICD-10-CM

## 2023-10-06 PROCEDURE — G0439 PPPS, SUBSEQ VISIT: HCPCS | Performed by: INTERNAL MEDICINE

## 2023-10-06 PROCEDURE — 99213 OFFICE O/P EST LOW 20 MIN: CPT | Performed by: INTERNAL MEDICINE

## 2023-10-06 PROCEDURE — 1126F AMNT PAIN NOTED NONE PRSNT: CPT | Performed by: INTERNAL MEDICINE

## 2023-11-08 NOTE — PROGRESS NOTES
HPI:     Dahlia Arita is a 70year old male with a PMH of asthma, OA. Following for:  1. Elevated PSA  - pMRI 3/30/23: ~ 64 g, Pi2, small fat-containing inguinal hernia  2. BPH/LUTS  3. calcified cyst in left kidney  - stable since 2013    PCP - Schriedel  Prior Urologist - Anish Buck (12/23/22)  [address] [place] [address] 5/17/2023    He currently feels well. Appetite and energy are good. Very sensitive to caffeine, benadryl, sudafed and can't void for a few hours. This has been a problem for at > 30 y. Has questions about son today who is disabled. Has micropenis with questions about UTI risk and PSA screening which we discussed. AUA SS is 2/35 with 1 n, f. Happy with LUTS. Incontinence: none  ARIE LOV: ~ 50 g prostate, no nodules or tenderness    UA is negative    ~ 100 % potency. Prior PSAs:  - 4.67, 4K 18.3% 5/17/23  - 5.10 3/3/23  - 4.01 1/5/23  - 4.79 11/21/22  - 3.55 11/15/19  - 4.19 10/9/19  - < 4 y prior    UTI hx: none  Gross hematuria: none  Tobacco hx: 5 pack years, quit 1985  Kidney stone hx: none  Fam h/o  malignancy: none    CT SP 9/18/20: 7 mm peripherally calcified left renal cyst    Have discussed both flomax and proscar as options for LUTS and reviewed SEs (including low risk of hypotension with flomax and possible sexual side effects with both medications). He prefers observation. Plan for PSA check today. Observation for BPH/LUTS but may consider proscar later. Observation for renal cyst. He prefers f/u in 6 mo with PSA prior and ARIE. HISTORY:  Past Medical History:   Diagnosis Date    Extrinsic asthma, unspecified     H/O [9/13] Rt inguinal hernia repair - BEVERLY Barker 5/1/2011    Melanoma in situ of left lower extremity ([facility] [address] 12/12/2018    Mixed rhinitis / Rx: Fexofenadine, Fluticasone 5/1/2011    Osteoarthritis     Other hemorrhoids 10/8/2018    Polyp of colon 10/8/2018    Presence of other cardiac implants and grafts Left knee joint replaced    S/P total knee arthroplasty, left     S/P [5/15] Lt knee lateral meniscus repair - HAYDEN Squires 2015    Tubular adenoma 2015    Visual impairment     Wears glasses       Past Surgical History:   Procedure Laterality Date    COLONOSCOPY  2013    Procedure: COLONOSCOPY;  Surgeon: Jordi Maher MD;  Location: [facility] ENDOSCOPY    COLONOSCOPY      HERNIA SURGERY  [address] [facility]    Laparoscopic Right Inguinal Hernia Repair    KNEE REPLACEMENT SURGERY  Left knee joint    TONSILLECTOMY        Family History   Problem Relation Age of Onset    Heart Disorder Father     Colon Cancer Mother         Diagnosed at 80      Social History:   Social History     Socioeconomic History    Marital status:    Tobacco Use    Smoking status: Former     Packs/day: 1.00     Years: 5.00     Additional pack years: 0.00     Total pack years: 5.00     Types: Cigarettes     Quit date: 1985     Years since quittin.9    Smokeless tobacco: Never   Vaping Use    Vaping Use: Never used   Substance and Sexual Activity    Alcohol use: No    Drug use: No        Medications (Active prior to today's visit):  Current Outpatient Medications   Medication Sig Dispense Refill    fluticasone-salmeterol 115-21 MCG/ACT Inhalation Aerosol Inhale 1 puff into the lungs 2 (two) times daily. 1 each 1    albuterol (PROVENTIL HFA) 108 (90 Base) MCG/ACT Inhalation Aero Soln Inhale 2 puffs into the lungs every 6 (six) hours as needed for Wheezing. 1 each 3       Allergies: Allergies   Allergen Reactions    Strawberries ANAPHYLAXIS and SWELLING     All over entire body including throat         ROS:     A comprehensive 10 point review of systems was completed. Pertinent positives and negatives noted in the the HPI.     PHYSICAL EXAM:     GENERAL APPEARANCE: well, developed, well nourished, in no acute distress  NEUROLOGIC: nonfocal, alert and oriented  HEAD: normocephalic, atraumatic  EYES: sclera non-icteric  EARS: hearing intact  ORAL CAVITY: mucosa moist  NECK/THYROID: no obvious goiter or masses  LUNGS: nonlabored breathing  ABDOMEN: soft, no obvious masses or tenderness  SKIN: no obvious rashes    : as noted above     ASSESSMENT/PLAN:   Diagnoses and all orders for this visit:    BPH with obstruction/lower urinary tract symptoms  -     URINALYSIS, AUTO, W/O SCOPE    Elevated PSA  -     PSA Total, Diagnostic; Future    Renal cyst    - as noted above. Thanks again for this consult.     Phillip Morton MD, [facility]  Urologist  [place] [address]  Office: [phone]

## 2023-11-17 ENCOUNTER — OFFICE VISIT (OUTPATIENT)
Dept: SURGERY | Facility: CLINIC | Age: 68
End: 2023-11-17
Payer: MEDICARE

## 2023-11-17 ENCOUNTER — LAB ENCOUNTER (OUTPATIENT)
Dept: LAB | Age: 68
End: 2023-11-17
Attending: UROLOGY
Payer: MEDICARE

## 2023-11-17 DIAGNOSIS — R97.20 ELEVATED PSA: ICD-10-CM

## 2023-11-17 DIAGNOSIS — N28.1 RENAL CYST: ICD-10-CM

## 2023-11-17 DIAGNOSIS — N13.8 BPH WITH OBSTRUCTION/LOWER URINARY TRACT SYMPTOMS: Primary | ICD-10-CM

## 2023-11-17 DIAGNOSIS — N40.1 BPH WITH OBSTRUCTION/LOWER URINARY TRACT SYMPTOMS: Primary | ICD-10-CM

## 2023-11-17 LAB
APPEARANCE: CLEAR
BILIRUBIN: NEGATIVE
GLUCOSE (URINE DIPSTICK): NEGATIVE MG/DL
LEUKOCYTES: NEGATIVE
MULTISTIX LOT#: ABNORMAL NUMERIC
NITRITE, URINE: NEGATIVE
PH, URINE: 5.5 (ref 4.5–8)
PROTEIN (URINE DIPSTICK): 30 MG/DL
PSA SERPL-MCNC: 5.94 NG/ML (ref ?–4)
SPECIFIC GRAVITY: 1.03 (ref 1–1.03)
URINE-COLOR: YELLOW
UROBILINOGEN,SEMI-QN: 0.2 MG/DL (ref 0–1.9)

## 2023-11-17 PROCEDURE — 81003 URINALYSIS AUTO W/O SCOPE: CPT | Performed by: UROLOGY

## 2023-11-17 PROCEDURE — 84153 ASSAY OF PSA TOTAL: CPT

## 2023-11-17 PROCEDURE — 36415 COLL VENOUS BLD VENIPUNCTURE: CPT

## 2023-11-17 PROCEDURE — 99214 OFFICE O/P EST MOD 30 MIN: CPT | Performed by: UROLOGY

## 2023-11-24 ENCOUNTER — TELEPHONE (OUTPATIENT)
Dept: SURGERY | Facility: CLINIC | Age: 68
End: 2023-11-24

## 2023-11-24 NOTE — TELEPHONE ENCOUNTER
RN called patient in response to his call. Patient reports tinge blood after urination. Denies urinary symptoms. LOV 11/17. RN encouraged to monitor at this time, push fluids and update office, if needed.  He agreed to plans

## 2024-01-07 ENCOUNTER — PATIENT MESSAGE (OUTPATIENT)
Dept: SURGERY | Facility: CLINIC | Age: 69
End: 2024-01-07

## 2024-01-08 RX ORDER — FINASTERIDE 5 MG/1
5 TABLET, FILM COATED ORAL DAILY
Qty: 90 TABLET | Refills: 3 | Status: SHIPPED | OUTPATIENT
Start: 2024-01-08

## 2024-01-08 NOTE — TELEPHONE ENCOUNTER
This encounter is now closed.     Seen by Dr Lambert on 11/17/23:    Plan for PSA check today. Observation for BPH/LUTS but may consider proscar later. Observation for renal cyst. He prefers f/u in 6 mo with PSA prior and ARIE.

## 2024-05-09 ENCOUNTER — PATIENT MESSAGE (OUTPATIENT)
Dept: SURGERY | Facility: CLINIC | Age: 69
End: 2024-05-09

## 2024-05-09 DIAGNOSIS — R97.20 ELEVATED PSA: Primary | ICD-10-CM

## 2024-05-09 NOTE — TELEPHONE ENCOUNTER
From: Demetrio Matta  To: Bharat Lambert  Sent: 5/9/2024 9:46 AM CDT  Subject: PSA test for upcoming visit    Can you provide a script for PSA blood test prior to office visit on 5/17. I will go to [facility] on [address] for blood draw.    Thank you,    Demetrio Matta

## 2024-05-09 NOTE — PROGRESS NOTES
HPI:     Demetrio Matta is a 68 year old male with a PMH of asthma, OA.    Following for:  1. Elevated PSA  - pMRI 3/30/23: ~ 64 g, Pi2, small fat-containing inguinal hernia  2. BPH/LUTS  - finasteride 1/8/24  3. calcified cyst in left kidney  - stable since 2013  4. Recent gross hematuria    PCP - Schriedel  Prior Urologist - Haylie (12/23/22)  LOV 11/17/2023    He feels well. Appetite and energy are good.   Noted painless gross hematuria in Nov 2023 and nothing since.    AUA SS is 5/35 witth 1 n, u, I, f, PANDA. Happy with LUTS.  Incontinence: very rare UI/dribbles  ARIE deferred. Last ARIE: ~ 50 g prostate, no nodules or tenderness    UA is negative and PVR is zero    ~ 80 % potency. Has not tried anything in the past.  Discussed both viagra and cialis as options and reviewed the risks and benefits and possible side effects. He would like to leave things alone.    Prior PSAs:  - 3.20 5/10/24  - 5.94 11/17/23  - 4.67, 4K 18.3% 5/17/23  - 5.10 3/3/23  - 4.01 1/5/23  - 4.79 11/21/22  - 3.55 11/15/19  - 4.19 10/9/19  - < 4 y prior    UTI hx: none  Gross hematuria: none  Tobacco hx: 5 pack years, quit 1985  Kidney stone hx: none  Fam h/o  malignancy: none    CT SP 9/18/20: 7 mm peripherally calcified left renal cyst    For hematuria I recommend we proceed with CT urogram and office cysto. We discussed the risks and benefits to these tests and the patient would like to proceed with both tests.    Continue proscar for BPH/LUTS. Observation for renal cyst. CTU and office cysto for hematuria eval (he is leaning towards cysto in Oct prior to going [place] for winter). PSA check at NOV and if low/stable can potential space out to annual checks.    Prior note:    Still very sensitive to caffeine, benadryl, sudafed and can't void for a few hours. This has been a problem for at > 30 y.    Has questions about son today who is disabled. Has micropenis with questions about UTI risk and PSA screening which we  discussed.    HISTORY:  Past Medical History:    Extrinsic asthma, unspecified    H/O [] Rt inguinal hernia repair - [facility]    Melanoma in situ of left lower extremity (HCC)    Mixed rhinitis / Rx: Fexofenadine, Fluticasone    Osteoarthritis    Other hemorrhoids    Polyp of colon    Presence of other cardiac implants and grafts    S/P total knee arthroplasty, left    S/P [5/15] Lt knee lateral meniscus repair - [facility]    Tubular adenoma    Visual impairment    Wears glasses      Past Surgical History:   Procedure Laterality Date    Colonoscopy  2013    Procedure: COLONOSCOPY;  Surgeon: Virgilio Meehan MD;  Location:  ENDOSCOPY    Colonoscopy      Hernia surgery  [address] [facility]    Laparoscopic Right Inguinal Hernia Repair    Knee replacement surgery  Left knee joint    Tonsillectomy        Family History   Problem Relation Age of Onset    Heart Disorder Father     Colon Cancer Mother         Diagnosed at 90      Social History:   Social History     Socioeconomic History    Marital status:    Tobacco Use    Smoking status: Former     Current packs/day: 0.00     Average packs/day: 1 pack/day for 5.0 years (5.0 ttl pk-yrs)     Types: Cigarettes     Start date: 1980     Quit date: 1985     Years since quittin.3    Smokeless tobacco: Never   Vaping Use    Vaping status: Never Used   Substance and Sexual Activity    Alcohol use: No    Drug use: No        Medications (Active prior to today's visit):  Current Outpatient Medications   Medication Sig Dispense Refill    finasteride 5 MG Oral Tab Take 1 tablet (5 mg total) by mouth daily. 90 tablet 3    fluticasone-salmeterol 115-21 MCG/ACT Inhalation Aerosol Inhale 1 puff into the lungs 2 (two) times daily. 1 each 1    albuterol (PROVENTIL HFA) 108 (90 Base) MCG/ACT Inhalation Aero Soln Inhale 2 puffs into the lungs every 6 (six) hours as needed for Wheezing. 1 each 3       Allergies:  Allergies   Allergen Reactions    Strawberries ANAPHYLAXIS  and SWELLING     All over entire body including throat         ROS:     A comprehensive 10 point review of systems was completed.  Pertinent positives and negatives noted in the the HPI.    PHYSICAL EXAM:     GENERAL APPEARANCE: well, developed, well nourished, in no acute distress  NEUROLOGIC: nonfocal, alert and oriented  HEAD: normocephalic, atraumatic  EYES: sclera non-icteric  EARS: hearing intact  ORAL CAVITY: mucosa moist  NECK/THYROID: no obvious goiter or masses  LUNGS: nonlabored breathing  ABDOMEN: soft, no obvious masses or tenderness  SKIN: no obvious rashes    : as noted above     ASSESSMENT/PLAN:   Diagnoses and all orders for this visit:    Elevated PSA    BPH with obstruction/lower urinary tract symptoms    Renal cyst    - as noted above.    Thanks again for this consult.    Bharat Lambert MD, FACS  Urologist  [facility]  Office: [phone]

## 2024-05-10 ENCOUNTER — LAB ENCOUNTER (OUTPATIENT)
Dept: LAB | Age: 69
End: 2024-05-10
Attending: UROLOGY

## 2024-05-10 DIAGNOSIS — R97.20 ELEVATED PSA: ICD-10-CM

## 2024-05-10 LAB
PSA FREE MFR SERPL: 15 %
PSA FREE SERPL-MCNC: 0.47 NG/ML
PSA SERPL-MCNC: 3.2 NG/ML (ref ?–4)

## 2024-05-10 PROCEDURE — 84154 ASSAY OF PSA FREE: CPT

## 2024-05-10 PROCEDURE — 84153 ASSAY OF PSA TOTAL: CPT

## 2024-05-10 PROCEDURE — 36415 COLL VENOUS BLD VENIPUNCTURE: CPT

## 2024-05-17 ENCOUNTER — HOSPITAL ENCOUNTER (OUTPATIENT)
Dept: CT IMAGING | Age: 69
Discharge: HOME | End: 2024-05-17
Attending: UROLOGY

## 2024-05-17 ENCOUNTER — OFFICE VISIT (OUTPATIENT)
Dept: SURGERY | Facility: CLINIC | Age: 69
End: 2024-05-17

## 2024-05-17 DIAGNOSIS — R31.0 GROSS HEMATURIA: ICD-10-CM

## 2024-05-17 DIAGNOSIS — R97.20 ELEVATED PSA: Primary | ICD-10-CM

## 2024-05-17 DIAGNOSIS — N28.1 RENAL CYST: ICD-10-CM

## 2024-05-17 DIAGNOSIS — N13.8 BPH WITH OBSTRUCTION/LOWER URINARY TRACT SYMPTOMS: ICD-10-CM

## 2024-05-17 DIAGNOSIS — N40.1 BPH WITH OBSTRUCTION/LOWER URINARY TRACT SYMPTOMS: ICD-10-CM

## 2024-05-17 LAB
APPEARANCE: CLEAR
BILIRUBIN: NEGATIVE
CREAT BLD-MCNC: 1.6 MG/DL
EGFRCR SERPLBLD CKD-EPI 2021: 46 ML/MIN/1.73M2 (ref 60–?)
GLUCOSE (URINE DIPSTICK): NEGATIVE MG/DL
KETONES (URINE DIPSTICK): NEGATIVE MG/DL
LEUKOCYTES: NEGATIVE
MULTISTIX LOT#: ABNORMAL NUMERIC
NITRITE, URINE: NEGATIVE
PH, URINE: 7 (ref 4.5–8)
PROTEIN (URINE DIPSTICK): NEGATIVE MG/DL
SPECIFIC GRAVITY: 1.02 (ref 1–1.03)
URINE-COLOR: YELLOW
UROBILINOGEN,SEMI-QN: 0.2 MG/DL (ref 0–1.9)

## 2024-05-17 PROCEDURE — 74178 CT ABD&PLV WO CNTR FLWD CNTR: CPT | Performed by: UROLOGY

## 2024-05-17 PROCEDURE — 81003 URINALYSIS AUTO W/O SCOPE: CPT | Performed by: UROLOGY

## 2024-05-17 PROCEDURE — 82565 ASSAY OF CREATININE: CPT

## 2024-05-17 PROCEDURE — 76377 3D RENDER W/INTRP POSTPROCES: CPT | Performed by: UROLOGY

## 2024-05-17 PROCEDURE — 99214 OFFICE O/P EST MOD 30 MIN: CPT | Performed by: UROLOGY

## 2024-05-20 NOTE — PROGRESS NOTES
[facility]  SLEEP PROGRESS NOTE        HPI:   This is a 68 year old male coming in for   Chief Complaint   Patient presents with    Consult     Sleep Consult / SS - 11/2013 ([facility])  No pap device / has oral appliance - ([facility])       HPI:     Treated for mild DANTE with dental device  His device is comfortable  Former patient of Dr. Ruiz  Last study in 2013      1030-730, SL rapid, wakes once on occasion  Retired  Wakes feeling rested  No daytime naps    Has lost about 17 pounds, trying to lose 10 more  Uses inhaler for asthma intermittnetly        Patient: Sleep review of systems today: see form.      Pt  PCP:  Adam Schriedel, MD  No referring provider defined for this encounter.           No data to display                    Past Medical History:    Allergic rhinitis    Extrinsic asthma, unspecified    H/O [9/13] Rt inguinal hernia repair - [facility]    Hyperlipidemia    Melanoma in situ of left lower extremity (HCC)    Mixed rhinitis / Rx: Fexofenadine, Fluticasone    Obesity    Osteoarthritis    Other hemorrhoids    Polyp of colon    Presence of other cardiac implants and grafts    S/P total knee arthroplasty, left    S/P [5/15] Lt knee lateral meniscus repair - [facility]    Tubular adenoma    Visual impairment    Wears glasses     Past Surgical History:   Procedure Laterality Date    Colonoscopy  11/13/2013    Procedure: COLONOSCOPY;  Surgeon: Virgilio Meehan MD;  Location:  ENDOSCOPY    Colonoscopy      Hernia surgery  9-16-13 [facility]    Laparoscopic Right Inguinal Hernia Repair    Knee replacement surgery  Left knee joint    Tonsillectomy      Vasectomy  1984     Social History:  Social History     Social History Narrative    Not on file     Social History     Socioeconomic History    Marital status:    Tobacco Use    Smoking status: Former     Current packs/day: 0.00     Average packs/day: 1 pack/day for 5.0 years (5.0 ttl pk-yrs)     Types: Cigarettes     Start date: 1/1/1980     Quit date:  1985     Years since quittin.4    Smokeless tobacco: Never   Vaping Use    Vaping status: Never Used   Substance and Sexual Activity    Alcohol use: No    Drug use: No     Family History:  Family History   Problem Relation Age of Onset    Heart Disorder Father     Colon Cancer Mother         Diagnosed at 90     Allergies:  Allergies   Allergen Reactions    Strawberries ANAPHYLAXIS and SWELLING     All over entire body including throat     Current Meds:  Current Outpatient Medications   Medication Sig Dispense Refill    finasteride 5 MG Oral Tab Take 1 tablet (5 mg total) by mouth daily. 90 tablet 3    fluticasone-salmeterol 115-21 MCG/ACT Inhalation Aerosol Inhale 1 puff into the lungs 2 (two) times daily. 1 each 1    albuterol (PROVENTIL HFA) 108 (90 Base) MCG/ACT Inhalation Aero Soln Inhale 2 puffs into the lungs every 6 (six) hours as needed for Wheezing. 1 each 3      Counseling given: Not Answered         Problem List:  Patient Active Problem List   Diagnosis    Hyperlipidemia [Mixed] / Rx: none [Statins cause myalgias] / LDL Goal // Ultrafast Heart Scan Score [] = 0    DANTE (obstructive sleep apnea) / PFT's [10/13] Normal / Rx Oral Appliance [3/14] [facility]    Renal cyst x 2 // Lt --> 1.3 cm complex; / Rt --> 2.1 cm simple / Dx [10/13] & [11/15] by U/S    Osteoarthritis of left knee    Personal history of colonic polyps    Diverticulosis of large intestine without perforation or abscess without bleeding    Family history of colon cancer    Morbid (severe) obesity due to excess calories (HCC)    Elevated PSA       REVIEW OF SYSTEMS:   Review of Systems    EXAM:   /70 (BP Location: Right arm, Patient Position: Sitting, Cuff Size: adult)   Pulse 77   Resp 16   Ht 6' 1\" (1.854 m)   Wt 280 lb (127 kg)   SpO2 96%   BMI 36.94 kg/m²  Estimated body mass index is 36.94 kg/m² as calculated from the following:    Height as of this encounter: 6' 1\" (1.854 m).    Weight as of this  encounter: 280 lb (127 kg).   Neck in inches:      Wt Readings from Last 6 Encounters:   05/21/24 280 lb (127 kg)   10/06/23 281 lb (127.5 kg)   08/05/22 280 lb (127 kg)   07/02/21 280 lb 9.6 oz (127.3 kg)   10/08/20 275 lb (124.7 kg)   10/09/19 284 lb (128.8 kg)     BP Readings from Last 3 Encounters:   05/21/24 112/70   10/06/23 122/70   03/08/23 110/64     Pulse Readings from Last 3 Encounters:   05/21/24 77   10/06/23 65   03/08/23 52     SpO2 Readings from Last 3 Encounters:   05/21/24 96%   10/06/23 95%   03/08/23 96%      Ideal body weight: 79.9 kg (176 lb 2.4 oz)  Adjusted ideal body weight: 98.7 kg (217 lb 11 oz)    Vital signs reviewed.  Physical Exam    ASSESSMENT AND PLAN:   1. DANTE (obstructive sleep apnea) / PFT's [10/13] Normal / Rx Oral Appliance [3/14] [facility]  Teeth have shifted ,  device does not fit any longer    Overall mild DANTE AHI 7, REM AHI 33  Never tested with oral appliance, he no longer snored  No daytime sleepiness    He would like to obtain a new dental appliance  Will need oral appliance titration    2. Hyperlipidemia [Mixed] / Rx: none [Statins cause myalgias] / LDL Goal // Ultrafast Heart Scan Score [4/12] = 0    There are no Patient Instructions on file for this visit.    Independent interpretation of Sleep Download as defined above.  Continue with Rx management of Sleep apnea with PAP therapy.    COMPLIANCE is required by insurance for 4 hours a night most nights of the week.    Advised if still with sleep apnea and not using CPAP has a 7 fold increase in risk of heart attack, stroke, abnormal heart rhythm  and death,  increased risk of driving accidents.     Advised to refrain from driving when sleepy.      Recommend weight loss, and maintain and optimal BMI with Exercise 30 minutes most days to target heart rate .     Advised patient to change filters,masks,hoses  and tubes and equiptment on a  regular schedule.    Filters and seals shall be changed every 1  month,  Hoses every 3 months,   CPAP mask and humidifier chamber changed every 6 month  with the durable medical equipment provider.         Meds & Refills for this Visit:  Requested Prescriptions      No prescriptions requested or ordered in this encounter       Outcome: Parent verbalizes understanding. Parent is notified to call with any questions, complications, allergies, or worsening or changing symptoms.  Parent is to call with any side effects or complications from the treatments as a result of today.     \" This note was created utilizing Dragon speech recognition software.  Please excuse any grammatical errors. Call my office if you have any questions regarding this note. \"     Deedee Rodriguez,   5/21/2024  10:53 AM

## 2024-05-21 ENCOUNTER — OFFICE VISIT (OUTPATIENT)
Facility: CLINIC | Age: 69
End: 2024-05-21

## 2024-05-21 VITALS
OXYGEN SATURATION: 96 % | SYSTOLIC BLOOD PRESSURE: 112 MMHG | WEIGHT: 280 LBS | BODY MASS INDEX: 37.11 KG/M2 | HEART RATE: 77 BPM | DIASTOLIC BLOOD PRESSURE: 70 MMHG | HEIGHT: 73 IN | RESPIRATION RATE: 16 BRPM

## 2024-05-21 DIAGNOSIS — E78.2 MIXED HYPERLIPIDEMIA: ICD-10-CM

## 2024-05-21 DIAGNOSIS — G47.33 OSA (OBSTRUCTIVE SLEEP APNEA): Primary | ICD-10-CM

## 2024-05-21 PROCEDURE — 99204 OFFICE O/P NEW MOD 45 MIN: CPT | Performed by: OTHER

## 2024-05-29 ENCOUNTER — TELEPHONE (OUTPATIENT)
Facility: CLINIC | Age: 69
End: 2024-05-29

## 2024-05-29 DIAGNOSIS — G47.33 OSA (OBSTRUCTIVE SLEEP APNEA): Primary | ICD-10-CM

## 2024-07-05 ENCOUNTER — PATIENT MESSAGE (OUTPATIENT)
Dept: INTERNAL MEDICINE CLINIC | Facility: CLINIC | Age: 69
End: 2024-07-05

## 2024-08-21 ENCOUNTER — TELEPHONE (OUTPATIENT)
Dept: SURGERY | Facility: CLINIC | Age: 69
End: 2024-08-21

## 2024-09-23 ENCOUNTER — PATIENT MESSAGE (OUTPATIENT)
Facility: CLINIC | Age: 69
End: 2024-09-23

## 2024-09-23 ENCOUNTER — TELEPHONE (OUTPATIENT)
Dept: INTERNAL MEDICINE CLINIC | Facility: CLINIC | Age: 69
End: 2024-09-23

## 2024-09-23 DIAGNOSIS — Z00.00 ROUTINE GENERAL MEDICAL EXAMINATION AT A HEALTH CARE FACILITY: Primary | ICD-10-CM

## 2024-09-23 DIAGNOSIS — Z13.228 SCREENING FOR METABOLIC DISORDER: ICD-10-CM

## 2024-09-23 DIAGNOSIS — Z13.0 SCREENING FOR DISORDER OF BLOOD AND BLOOD-FORMING ORGANS: ICD-10-CM

## 2024-09-23 DIAGNOSIS — Z13.29 SCREENING FOR THYROID DISORDER: ICD-10-CM

## 2024-09-23 DIAGNOSIS — Z13.220 SCREENING FOR LIPID DISORDERS: ICD-10-CM

## 2024-09-25 NOTE — TELEPHONE ENCOUNTER
From: Demetrio Matta  To: Deedee Rodriguez  Sent: 9/23/2024 12:24 PM CDT  Subject: Referral for replacement of New Oral Device    I am requesting that [facility] completed in 2013 and recommendation for a new Oral device due to no longer fitting properly. Please fax information to [phone] so I can proceed with making an appointment.    Thank You,    Demetrio Matta

## 2024-09-25 NOTE — TELEPHONE ENCOUNTER
Nurse faxed sleep study results, (per pt's request), and detailed [employer] message sent to pt.    [phone] (home)

## 2024-10-03 ENCOUNTER — LAB ENCOUNTER (OUTPATIENT)
Dept: LAB | Age: 69
End: 2024-10-03
Attending: INTERNAL MEDICINE
Payer: MEDICARE

## 2024-10-03 DIAGNOSIS — Z13.220 SCREENING FOR LIPID DISORDERS: ICD-10-CM

## 2024-10-03 DIAGNOSIS — Z13.0 SCREENING FOR DISORDER OF BLOOD AND BLOOD-FORMING ORGANS: ICD-10-CM

## 2024-10-03 DIAGNOSIS — Z13.228 SCREENING FOR METABOLIC DISORDER: ICD-10-CM

## 2024-10-03 DIAGNOSIS — R97.20 ELEVATED PSA: ICD-10-CM

## 2024-10-03 DIAGNOSIS — Z13.29 SCREENING FOR THYROID DISORDER: ICD-10-CM

## 2024-10-03 DIAGNOSIS — Z00.00 ROUTINE GENERAL MEDICAL EXAMINATION AT A HEALTH CARE FACILITY: ICD-10-CM

## 2024-10-03 LAB
ALBUMIN SERPL-MCNC: 4.1 G/DL (ref 3.2–4.8)
ALBUMIN/GLOB SERPL: 1.5 {RATIO} (ref 1–2)
ALP LIVER SERPL-CCNC: 91 U/L
ALT SERPL-CCNC: 28 U/L
ANION GAP SERPL CALC-SCNC: 1 MMOL/L (ref 0–18)
AST SERPL-CCNC: 28 U/L (ref ?–34)
BASOPHILS # BLD AUTO: 0.03 X10(3) UL (ref 0–0.2)
BASOPHILS NFR BLD AUTO: 0.6 %
BILIRUB SERPL-MCNC: 0.6 MG/DL (ref 0.2–1.1)
BUN BLD-MCNC: 15 MG/DL (ref 9–23)
CALCIUM BLD-MCNC: 9.8 MG/DL (ref 8.7–10.4)
CHLORIDE SERPL-SCNC: 108 MMOL/L (ref 98–112)
CHOLEST SERPL-MCNC: 222 MG/DL (ref ?–200)
CO2 SERPL-SCNC: 29 MMOL/L (ref 21–32)
CREAT BLD-MCNC: 1.17 MG/DL
EGFRCR SERPLBLD CKD-EPI 2021: 67 ML/MIN/1.73M2 (ref 60–?)
EOSINOPHIL # BLD AUTO: 0.15 X10(3) UL (ref 0–0.7)
EOSINOPHIL NFR BLD AUTO: 2.8 %
ERYTHROCYTE [DISTWIDTH] IN BLOOD BY AUTOMATED COUNT: 12.7 %
FASTING PATIENT LIPID ANSWER: YES
FASTING STATUS PATIENT QL REPORTED: YES
GLOBULIN PLAS-MCNC: 2.8 G/DL (ref 2–3.5)
GLUCOSE BLD-MCNC: 83 MG/DL (ref 70–99)
HCT VFR BLD AUTO: 48 %
HDLC SERPL-MCNC: 47 MG/DL (ref 40–59)
HGB BLD-MCNC: 16.3 G/DL
IMM GRANULOCYTES # BLD AUTO: 0.01 X10(3) UL (ref 0–1)
IMM GRANULOCYTES NFR BLD: 0.2 %
LDLC SERPL CALC-MCNC: 145 MG/DL (ref ?–100)
LYMPHOCYTES # BLD AUTO: 1.62 X10(3) UL (ref 1–4)
LYMPHOCYTES NFR BLD AUTO: 29.9 %
MCH RBC QN AUTO: 30.9 PG (ref 26–34)
MCHC RBC AUTO-ENTMCNC: 34 G/DL (ref 31–37)
MCV RBC AUTO: 90.9 FL
MONOCYTES # BLD AUTO: 0.57 X10(3) UL (ref 0.1–1)
MONOCYTES NFR BLD AUTO: 10.5 %
NEUTROPHILS # BLD AUTO: 3.04 X10 (3) UL (ref 1.5–7.7)
NEUTROPHILS # BLD AUTO: 3.04 X10(3) UL (ref 1.5–7.7)
NEUTROPHILS NFR BLD AUTO: 56 %
NONHDLC SERPL-MCNC: 175 MG/DL (ref ?–130)
OSMOLALITY SERPL CALC.SUM OF ELEC: 286 MOSM/KG (ref 275–295)
PLATELET # BLD AUTO: 224 10(3)UL (ref 150–450)
POTASSIUM SERPL-SCNC: 4 MMOL/L (ref 3.5–5.1)
PROT SERPL-MCNC: 6.9 G/DL (ref 5.7–8.2)
PSA SERPL-MCNC: 3 NG/ML (ref ?–4)
RBC # BLD AUTO: 5.28 X10(6)UL
SODIUM SERPL-SCNC: 138 MMOL/L (ref 136–145)
T3FREE SERPL-MCNC: 3.52 PG/ML (ref 2.4–4.2)
T4 FREE SERPL-MCNC: 1.2 NG/DL (ref 0.8–1.7)
TRIGL SERPL-MCNC: 166 MG/DL (ref 30–149)
TSI SER-ACNC: 0.55 MIU/ML (ref 0.55–4.78)
VLDLC SERPL CALC-MCNC: 31 MG/DL (ref 0–30)
WBC # BLD AUTO: 5.4 X10(3) UL (ref 4–11)

## 2024-10-03 PROCEDURE — 80053 COMPREHEN METABOLIC PANEL: CPT

## 2024-10-03 PROCEDURE — 80061 LIPID PANEL: CPT

## 2024-10-03 PROCEDURE — 36415 COLL VENOUS BLD VENIPUNCTURE: CPT

## 2024-10-03 PROCEDURE — 84443 ASSAY THYROID STIM HORMONE: CPT

## 2024-10-03 PROCEDURE — 84153 ASSAY OF PSA TOTAL: CPT

## 2024-10-03 PROCEDURE — 84481 FREE ASSAY (FT-3): CPT

## 2024-10-03 PROCEDURE — 84439 ASSAY OF FREE THYROXINE: CPT

## 2024-10-03 PROCEDURE — 85025 COMPLETE CBC W/AUTO DIFF WBC: CPT

## 2024-10-08 ENCOUNTER — TELEPHONE (OUTPATIENT)
Facility: CLINIC | Age: 69
End: 2024-10-08

## 2024-10-08 DIAGNOSIS — E78.2 MIXED HYPERLIPIDEMIA: ICD-10-CM

## 2024-10-08 DIAGNOSIS — E66.01 MORBID (SEVERE) OBESITY DUE TO EXCESS CALORIES (HCC): ICD-10-CM

## 2024-10-08 DIAGNOSIS — G47.33 OSA (OBSTRUCTIVE SLEEP APNEA): Primary | ICD-10-CM

## 2024-10-08 NOTE — TELEPHONE ENCOUNTER
Pt contacted office, hospitals needs update sleep study to apply for new dental device at [facility].  Per pt last sleep study was 2013. Pt aware to call and schedule testing. [phone].  Pt verbalize clear understanding and agree to plan.    [phone] (home)

## 2024-10-11 ENCOUNTER — OFFICE VISIT (OUTPATIENT)
Dept: SLEEP CENTER | Age: 69
End: 2024-10-11
Attending: Other
Payer: MEDICARE

## 2024-10-11 DIAGNOSIS — E66.01 MORBID (SEVERE) OBESITY DUE TO EXCESS CALORIES (HCC): ICD-10-CM

## 2024-10-11 DIAGNOSIS — E78.2 MIXED HYPERLIPIDEMIA: ICD-10-CM

## 2024-10-11 DIAGNOSIS — G47.33 OSA (OBSTRUCTIVE SLEEP APNEA): ICD-10-CM

## 2024-10-11 PROCEDURE — 95810 POLYSOM 6/> YRS 4/> PARAM: CPT

## 2024-10-15 NOTE — PROGRESS NOTES
HPI:     Demetrio Matta is a 69 year old male with a PMH of asthma, OA.    Following for:  1. Elevated PSA  - pMRI 3/30/23: ~ 64 g, Pi2, small fat-containing inguinal hernia  2. BPH/LUTS  - finasteride 1/8/24  3. calcified cyst in left kidney  - stable since 2013  4. Recent gross hematuria    PCP - Schriedel  Prior Urologist - Haylie (12/23/22)  LOV 5/17/2024    Presents for check-up, office cysto, review CT, discuss next steps. He wanted to wait until now to do cysto prior to [place].    He feels well. He is taking finasteride. Appetite and energy are good.   Noted painless gross hematuria in Nov 2023 and nothing since.    AUA SS is 5/35 witth 1 n, u, I, f, PANDA. Happy with LUTS.  Incontinence: resolved - LOV noted very rare UI/dribbles  ARIE deferred. Last ARIE: ~ 50 g prostate, no nodules or tenderness    UA is negative and PVR was zero    ~ 80 % potency. Has not tried anything in the past. Have discussed both viagra and cialis as options and reviewed the risks and benefits and possible side effects and he prefers observation.    Prior PSAs:  - 3.00 10/3/24  - 3.20 5/10/24  - 5.94 11/17/23  - 4.67, 4K 18.3% 5/17/23  - 5.10 3/3/23  - 4.01 1/5/23  - 4.79 11/21/22  - 3.55 11/15/19  - 4.19 10/9/19  - < 4 y prior    UTI hx: none  Gross hematuria: none  Tobacco hx: 5 pack years, quit 1985  Kidney stone hx: none  Fam h/o  malignancy: none    CTU 5/17/24: 28 RMP parapelvic cyst, stable 7 mm partially calcified complex cyst  CT SP 9/18/20: 7 mm peripherally calcified left renal cyst    Cysto: mod BPH with mod CHRIS. No other abnormalities.    Discussed we expect PSA to lower while on proscar.    Continue proscar for BPH/LUTS. Observation for ED. Observation for renal cyst. PSA is low and stable so he'd prefer f/u in 1 y with PSA prior or same day.    PROCEDURE NOTE    PROCEDURE PERFORMED: Flexible Cystoscopy    After informed consent and urinalysis was obtained, he was placed in the supine position  and prepped and draped in the usual sterile fashion using Betadine. Local anesthesia was induced by the introduction of 2% Lidocaine jelly per urethra.  A 16 [place]uran flexible scope was passed through the anterior urethra, the posterior urethra and prostate were negotiated and the bladder was entered. The entirety of the bladder was examined and the scope was retroflexed to examine the bladder neck.     Findings: as noted above    The patient tolerated the procedure well, suffered no complications, was able to void spontaneously after completion of the procedure in the office, and left the office in good condition.    Shannon-procedural antibiotics were given.  _________________________________    Prior note:    Still very sensitive to caffeine, benadryl, sudafed and can't void for a few hours. This has been a problem for at > 30 y.    Has questions about son today who is disabled. Has micropenis with questions about UTI risk and PSA screening which we discussed.    HISTORY:  Past Medical History:    Allergic rhinitis    Extrinsic asthma, unspecified    H/O [9/13] Rt inguinal hernia repair - [facility]    Hyperlipidemia    Melanoma in situ of left lower extremity (HCC)    Mixed rhinitis / Rx: Fexofenadine, Fluticasone    Obesity    Osteoarthritis    Other hemorrhoids    Polyp of colon    Presence of other cardiac implants and grafts    S/P total knee arthroplasty, left    S/P [5/15] Lt knee lateral meniscus repair - [facility]    Tubular adenoma    Visual impairment    Wears glasses      Past Surgical History:   Procedure Laterality Date    Colonoscopy  11/13/2013    Procedure: COLONOSCOPY;  Surgeon: Virgilio Meehan MD;  Location:  ENDOSCOPY    Colonoscopy      Hernia surgery  9-16-13 [facility]    Laparoscopic Right Inguinal Hernia Repair    Knee replacement surgery  Left knee joint    Tonsillectomy      Vasectomy  1984      Family History   Problem Relation Age of Onset    Heart Disorder Father     Colon Cancer Mother          Diagnosed at 90      Social History:   Social History     Socioeconomic History    Marital status:    Tobacco Use    Smoking status: Former     Current packs/day: 0.00     Average packs/day: 1 pack/day for 5.0 years (5.0 ttl pk-yrs)     Types: Cigarettes     Start date: 1980     Quit date: 1985     Years since quittin.8    Smokeless tobacco: Never   Vaping Use    Vaping status: Never Used   Substance and Sexual Activity    Alcohol use: No    Drug use: No        Medications (Active prior to today's visit):  Current Outpatient Medications   Medication Sig Dispense Refill    finasteride 5 MG Oral Tab Take 1 tablet (5 mg total) by mouth daily. 90 tablet 5    fluticasone-salmeterol 115-21 MCG/ACT Inhalation Aerosol Inhale 1 puff into the lungs 2 (two) times daily. 1 each 1    albuterol (PROVENTIL HFA) 108 (90 Base) MCG/ACT Inhalation Aero Soln Inhale 2 puffs into the lungs every 6 (six) hours as needed for Wheezing. 1 each 3       Allergies:  Allergies   Allergen Reactions    Strawberries ANAPHYLAXIS and SWELLING     All over entire body including throat         ROS:     A comprehensive 10 point review of systems was completed.  Pertinent positives and negatives noted in the the HPI.    PHYSICAL EXAM:     GENERAL APPEARANCE: well, developed, well nourished, in no acute distress  NEUROLOGIC: nonfocal, alert and oriented  HEAD: normocephalic, atraumatic  EYES: sclera non-icteric  EARS: hearing intact  ORAL CAVITY: mucosa moist  NECK/THYROID: no obvious goiter or masses  LUNGS: nonlabored breathing  ABDOMEN: soft, no obvious masses or tenderness  SKIN: no obvious rashes    : as noted above     ASSESSMENT/PLAN:   Diagnoses and all orders for this visit:    Elevated PSA  -     URINALYSIS, AUTO, W/O SCOPE  -     PSA Total, Diagnostic; Future    BPH with obstruction/lower urinary tract symptoms  -     URINALYSIS, AUTO, W/O SCOPE  -     finasteride 5 MG Oral Tab; Take 1 tablet (5 mg total) by mouth  daily.    Renal cyst  -     URINALYSIS, AUTO, W/O SCOPE    Gross hematuria  -     URINALYSIS, AUTO, W/O SCOPE  -     sulfamethoxazole-trimethoprim DS (Bactrim DS) 800-160 MG per tab 1 tablet  -     CYSTOURETHROSCOPY    - as noted above.    Thanks again for this consult.    Bharat Lambert MD, FACS  Urologist  [facility]  Office: [phone]

## 2024-10-17 ENCOUNTER — PATIENT MESSAGE (OUTPATIENT)
Facility: CLINIC | Age: 69
End: 2024-10-17

## 2024-10-17 ENCOUNTER — NURSE ONLY (OUTPATIENT)
Dept: INTERNAL MEDICINE CLINIC | Facility: CLINIC | Age: 69
End: 2024-10-17
Payer: MEDICARE

## 2024-10-17 DIAGNOSIS — Z23 NEED FOR VACCINATION: Primary | ICD-10-CM

## 2024-10-17 PROCEDURE — 90662 IIV NO PRSV INCREASED AG IM: CPT | Performed by: INTERNAL MEDICINE

## 2024-10-17 PROCEDURE — G0008 ADMIN INFLUENZA VIRUS VAC: HCPCS | Performed by: INTERNAL MEDICINE

## 2024-10-22 ENCOUNTER — PROCEDURE (OUTPATIENT)
Dept: SURGERY | Facility: CLINIC | Age: 69
End: 2024-10-22
Payer: MEDICARE

## 2024-10-22 DIAGNOSIS — N40.1 BPH WITH OBSTRUCTION/LOWER URINARY TRACT SYMPTOMS: ICD-10-CM

## 2024-10-22 DIAGNOSIS — R97.20 ELEVATED PSA: Primary | ICD-10-CM

## 2024-10-22 DIAGNOSIS — N28.1 RENAL CYST: ICD-10-CM

## 2024-10-22 DIAGNOSIS — N13.8 BPH WITH OBSTRUCTION/LOWER URINARY TRACT SYMPTOMS: ICD-10-CM

## 2024-10-22 DIAGNOSIS — R31.0 GROSS HEMATURIA: ICD-10-CM

## 2024-10-22 PROCEDURE — 81003 URINALYSIS AUTO W/O SCOPE: CPT | Performed by: UROLOGY

## 2024-10-22 PROCEDURE — 52000 CYSTOURETHROSCOPY: CPT | Performed by: UROLOGY

## 2024-10-22 PROCEDURE — 99214 OFFICE O/P EST MOD 30 MIN: CPT | Performed by: UROLOGY

## 2024-10-22 RX ORDER — SULFAMETHOXAZOLE AND TRIMETHOPRIM 800; 160 MG/1; MG/1
1 TABLET ORAL ONCE
Status: COMPLETED | OUTPATIENT
Start: 2024-10-22 | End: 2024-10-22

## 2024-10-22 RX ORDER — FINASTERIDE 5 MG/1
5 TABLET, FILM COATED ORAL DAILY
Qty: 90 TABLET | Refills: 5 | Status: SHIPPED | OUTPATIENT
Start: 2024-10-22

## 2024-10-22 RX ADMIN — SULFAMETHOXAZOLE AND TRIMETHOPRIM 1 TABLET: 800; 160 TABLET ORAL at 09:52:00

## 2024-10-23 ENCOUNTER — SLEEP STUDY (OUTPATIENT)
Facility: CLINIC | Age: 69
End: 2024-10-23
Payer: MEDICARE

## 2024-10-23 DIAGNOSIS — G47.33 OBSTRUCTIVE SLEEP APNEA SYNDROME: Primary | ICD-10-CM

## 2024-10-23 PROCEDURE — 95810 POLYSOM 6/> YRS 4/> PARAM: CPT | Performed by: OTHER

## 2024-10-28 ENCOUNTER — OFFICE VISIT (OUTPATIENT)
Facility: CLINIC | Age: 69
End: 2024-10-28
Payer: MEDICARE

## 2024-10-28 ENCOUNTER — OFFICE VISIT (OUTPATIENT)
Dept: INTERNAL MEDICINE CLINIC | Facility: CLINIC | Age: 69
End: 2024-10-28
Payer: MEDICARE

## 2024-10-28 VITALS
SYSTOLIC BLOOD PRESSURE: 118 MMHG | BODY MASS INDEX: 36.19 KG/M2 | WEIGHT: 282 LBS | HEART RATE: 74 BPM | HEIGHT: 74 IN | RESPIRATION RATE: 16 BRPM | OXYGEN SATURATION: 95 % | DIASTOLIC BLOOD PRESSURE: 80 MMHG

## 2024-10-28 VITALS
SYSTOLIC BLOOD PRESSURE: 112 MMHG | BODY MASS INDEX: 35.84 KG/M2 | HEART RATE: 71 BPM | HEIGHT: 74 IN | OXYGEN SATURATION: 100 % | DIASTOLIC BLOOD PRESSURE: 80 MMHG | WEIGHT: 279.25 LBS | TEMPERATURE: 98 F

## 2024-10-28 DIAGNOSIS — M17.12 PRIMARY OSTEOARTHRITIS OF LEFT KNEE: ICD-10-CM

## 2024-10-28 DIAGNOSIS — G47.33 OSA (OBSTRUCTIVE SLEEP APNEA): Primary | ICD-10-CM

## 2024-10-28 DIAGNOSIS — E66.9 OBESITY (BMI 30-39.9): ICD-10-CM

## 2024-10-28 DIAGNOSIS — R97.20 ELEVATED PSA: ICD-10-CM

## 2024-10-28 DIAGNOSIS — Z86.0100 HISTORY OF COLONIC POLYPS: ICD-10-CM

## 2024-10-28 DIAGNOSIS — G47.33 OSA (OBSTRUCTIVE SLEEP APNEA): ICD-10-CM

## 2024-10-28 DIAGNOSIS — Z00.00 ENCOUNTER FOR ANNUAL HEALTH EXAMINATION: ICD-10-CM

## 2024-10-28 DIAGNOSIS — N28.1 RENAL CYST, LEFT: ICD-10-CM

## 2024-10-28 DIAGNOSIS — E78.2 MIXED HYPERLIPIDEMIA: Primary | ICD-10-CM

## 2024-10-28 DIAGNOSIS — E66.01 MORBID (SEVERE) OBESITY DUE TO EXCESS CALORIES (HCC): ICD-10-CM

## 2024-10-28 DIAGNOSIS — K57.30 DIVERTICULOSIS OF LARGE INTESTINE WITHOUT PERFORATION OR ABSCESS WITHOUT BLEEDING: ICD-10-CM

## 2024-10-28 DIAGNOSIS — Z80.0 FAMILY HISTORY OF COLON CANCER: ICD-10-CM

## 2024-10-28 PROCEDURE — G0439 PPPS, SUBSEQ VISIT: HCPCS | Performed by: INTERNAL MEDICINE

## 2024-10-28 PROCEDURE — 99213 OFFICE O/P EST LOW 20 MIN: CPT | Performed by: INTERNAL MEDICINE

## 2024-10-28 NOTE — PROGRESS NOTES
[facility] PULMONARY  SLEEP PROGRESS NOTE        HPI:   This is a 69 year old male coming in for   Chief Complaint   Patient presents with    Obstructive Sleep Apnea (DANTE)     Pt here for follow up.  S/S performed 10/21/24. Pt states no new issues at this time.       HPI:     Previously treated with dental device- teeth shifted  He had wanted to get replacement dental device  Had updated PSG without device     Snoring resolved with last dental device  Did not feel drowsy during the day  Notices a difference when he doesn't wear his dental device    Patient: Sleep review of systems today: see form.    Jackson score: 3/24    In bed 1030-11p  Out of bed 7a  SL minutes  Nighttime awakenings 1x, no trouble falling back asleep  Naps none    Denies teeth grinding, leg cramps, restless legs, headaches, tinnitus, sleep walking, sleep talking, sleep paralysis, sleep attacks, cataplexy    Dry mouth related to dental device      10/11/2024 PSG  Respiratory Analysis: The Apnea-Hypopnea Index (AHI) was 5.1 events per hour. REM related AHI was 30.0 events per hour. Supine related AHI was 4.3. Lateral related AHI was 5.6. The Central Apnea Index was 0. The oxygen saturation kecia was 86% and patient spent 4.3% with saturations less than 88%.     Last CBC  Lab Results   Component Value Date    WBC 5.4 10/03/2024    RBC 5.28 10/03/2024    HGB 16.3 10/03/2024    HCT 48.0 10/03/2024    MCV 90.9 10/03/2024    MCH 30.9 10/03/2024    MCHC 34.0 10/03/2024    RDW 12.7 10/03/2024    .0 10/03/2024        Last CMP/BMP  Lab Results   Component Value Date    GLU 83 10/03/2024    BUN 15 10/03/2024    BUNCREA 15.5 06/28/2021    CREATSERUM 1.17 10/03/2024    ANIONGAP 1 10/03/2024    GFR 61 10/19/2016    GFRNAA 70 06/28/2021    GFRAA 80 06/28/2021    CA 9.8 10/03/2024    OSMOCALC 286 10/03/2024    ALKPHO 91 10/03/2024    AST 28 10/03/2024    ALT 28 10/03/2024    BILT 0.6 10/03/2024    TP 6.9 10/03/2024    ALB 4.1 10/03/2024    GLOBULIN 2.8  10/03/2024    AGRATIO 1.9 2015     10/03/2024    K 4.0 10/03/2024     10/03/2024    CO2 29.0 10/03/2024       Last iron panel  No results found for: \"IRON\", \"IRONTOT\"  No results found for: \"CHRISTINA\"      Pt  PCP:  Adam Schriedel, MD  No referring provider defined for this encounter.           No data to display                  Past Medical History:    Allergic rhinitis    Extrinsic asthma, unspecified    H/O [] Rt inguinal hernia repair - BKal[facility] Green    Hyperlipidemia    Melanoma in situ of left lower extremity (HCC)    Mixed rhinitis / Rx: Fexofenadine, Fluticasone    Obesity    Osteoarthritis    Other hemorrhoids    Polyp of colon    Presence of other cardiac implants and grafts    S/P total knee arthroplasty, left    S/P [5/15] Lt knee lateral meniscus repair - HAYDEN Merino    Tubular adenoma    Visual impairment    Wears glasses     Past Surgical History:   Procedure Laterality Date    Colonoscopy  2013    Procedure: COLONOSCOPY;  Surgeon: Virgilio Meehan MD;  Location:  ENDOSCOPY    Colonoscopy      Hernia surgery  [address]    Laparoscopic Right Inguinal Hernia Repair    Knee replacement surgery  Left knee joint    Tonsillectomy      Vasectomy       Social History:  Social History     Social History Narrative    Not on file     Social History     Socioeconomic History    Marital status:    Tobacco Use    Smoking status: Former     Current packs/day: 0.00     Average packs/day: 1 pack/day for 5.0 years (5.0 ttl pk-yrs)     Types: Cigarettes     Start date: 1980     Quit date: 1985     Years since quittin.8    Smokeless tobacco: Never   Vaping Use    Vaping status: Never Used   Substance and Sexual Activity    Alcohol use: No    Drug use: No     Family History:  Family History   Problem Relation Age of Onset    Heart Disorder Father     Colon Cancer Mother         Diagnosed at 90     Allergies:  Allergies[1]  Current Meds:  Current Outpatient Medications    Medication Sig Dispense Refill    finasteride 5 MG Oral Tab Take 1 tablet (5 mg total) by mouth daily. 90 tablet 5    fluticasone-salmeterol 115-21 MCG/ACT Inhalation Aerosol Inhale 1 puff into the lungs 2 (two) times daily. (Patient not taking: Reported on 10/28/2024) 1 each 1    albuterol (PROVENTIL HFA) 108 (90 Base) MCG/ACT Inhalation Aero Soln Inhale 2 puffs into the lungs every 6 (six) hours as needed for Wheezing. 1 each 3      Counseling given: Not Answered         Problem List:  Patient Active Problem List   Diagnosis    Hyperlipidemia [Mixed] / Rx: none [Statins cause myalgias] / LDL Goal // Ultrafast Heart Scan Score [4/12] = 0    DANTE (obstructive sleep apnea) / PFT's [10/13] Normal / Rx Oral Appliance [3/14] [facility]    Renal cyst x 2 // Lt --> 1.3 cm complex; / Rt --> 2.1 cm simple / Dx [10/13] & [11/15] by U/S    Osteoarthritis of left knee    Personal history of colonic polyps    Diverticulosis of large intestine without perforation or abscess without bleeding    Family history of colon cancer    Morbid (severe) obesity due to excess calories (HCC)    Elevated PSA       REVIEW OF SYSTEMS:   Review of Systems  See HPI    EXAM:   /80   Pulse 74   Resp 16   Ht 6' 2\" (1.88 m)   Wt 282 lb (127.9 kg)   SpO2 95%   BMI 36.21 kg/m²  Estimated body mass index is 36.21 kg/m² as calculated from the following:    Height as of this encounter: 6' 2\" (1.88 m).    Weight as of this encounter: 282 lb (127.9 kg).   Neck in inches:      Wt Readings from Last 6 Encounters:   10/28/24 282 lb (127.9 kg)   10/28/24 279 lb 4 oz (126.7 kg)   05/21/24 280 lb (127 kg)   10/06/23 281 lb (127.5 kg)   08/05/22 280 lb (127 kg)   07/02/21 280 lb 9.6 oz (127.3 kg)     BP Readings from Last 3 Encounters:   10/28/24 118/80   10/28/24 112/80   05/21/24 112/70     Pulse Readings from Last 3 Encounters:   10/28/24 74   10/28/24 71   05/21/24 77     SpO2 Readings from Last 3 Encounters:   10/28/24 95%    10/28/24 100%   05/21/24 96%      Ideal body weight: 82.2 kg (181 lb 3.5 oz)  Adjusted ideal body weight: 100.5 kg (221 lb 8.5 oz)    Vital signs reviewed.  Physical Exam  Vitals and nursing note reviewed.   Constitutional:       Appearance: Normal appearance. He is obese.   HENT:      Head: Normocephalic and atraumatic.      Right Ear: External ear normal.      Left Ear: External ear normal.   Pulmonary:      Effort: Pulmonary effort is normal. No respiratory distress.   Musculoskeletal:      Cervical back: Normal range of motion and neck supple.   Neurological:      General: No focal deficit present.      Mental Status: He is alert and oriented to person, place, and time.   Psychiatric:         Attention and Perception: Attention and perception normal.         Mood and Affect: Mood and affect normal.         Speech: Speech normal.         Behavior: Behavior normal. Behavior is cooperative.         Thought Content: Thought content normal.         Cognition and Memory: Cognition and memory normal.         Judgment: Judgment normal.         ASSESSMENT AND PLAN:   1. DANTE (obstructive sleep apnea) / PFT's [10/13] Normal / Rx Oral Appliance [3/14] [facility]  - Updated PSG  - Place order for dental device   - Plan for a HST after he gets acclimated to new dental device - preferably in April due to travel plans    2. Morbid (severe) obesity due to excess calories (HCC)  - Has lost 14# over 30 years, weight is stable       There are no Patient Instructions on file for this visit.    Independent interpretation of Sleep Download as defined above.  Continue with Rx management of Sleep apnea with PAP therapy.    COMPLIANCE is required by insurance for 4 hours a night most nights of the week.    Advised if still with sleep apnea and not using CPAP has a 7 fold increase in risk of heart attack, stroke, abnormal heart rhythm  and death,  increased risk of driving accidents.     Advised to refrain from driving  when sleepy.      Recommend weight loss, and maintain and optimal BMI with Exercise 30 minutes most days to target heart rate .     Advised patient to change filters,masks,hoses  and tubes and equiptment on a  regular schedule.    Filters and seals shall be changed every 1 month,  Hoses every 3 months,   CPAP mask and humidifier chamber changed every 6 month  with the durable medical equipment provider.         Meds & Refills for this Visit:  Requested Prescriptions      No prescriptions requested or ordered in this encounter       Outcome: Parent verbalizes understanding. Parent is notified to call with any questions, complications, allergies, or worsening or changing symptoms.  Parent is to call with any side effects or complications from the treatments as a result of today.     \" This note was created utilizing Dragon speech recognition software.  Please excuse any grammatical errors. Call my office if you have any questions regarding this note. \"     Nelda Regan PA-C  10/28/2024  1:37 PM         [1]   Allergies  Allergen Reactions    Strawberries ANAPHYLAXIS and SWELLING     All over entire body including throat

## 2024-11-07 PROBLEM — Z86.0100 HISTORY OF COLONIC POLYPS: Status: ACTIVE | Noted: 2018-10-08

## 2024-11-07 PROBLEM — E66.9 OBESITY (BMI 30-39.9): Status: ACTIVE | Noted: 2024-11-07

## 2024-11-07 PROBLEM — E66.01 MORBID (SEVERE) OBESITY DUE TO EXCESS CALORIES (HCC): Status: RESOLVED | Noted: 2022-08-05 | Resolved: 2024-11-07

## 2024-11-08 NOTE — PROGRESS NOTES
Subjective:   Demetrio Matta is a 69 year old male who presents for a Medicare Subsequent Annual Wellness visit (Pt already had Initial Annual Wellness) and scheduled follow up of multiple significant but stable problems.   Here for awv and f/u of chronic conditions.   He has stable high hol, improving wt, earle on cpap, renal cyst which is stable. Pt has been feleing well, no complaints today. Chronic issues are stable on meds, labs reviwed.       History/Other:   Fall Risk Assessment:   He has been screened for Falls and is low risk.      Cognitive Assessment:   He had a completely normal cognitive assessment - see flowsheet entries     Functional Ability/Status:   Demetrio Matta has a completely normal functional assessment. See flowsheet for details.        Depression Screening (PHQ):  PHQ-2 SCORE: 0  , done 10/28/2024             Advanced Directives:   He does have a Living Will but we do NOT have it on file in Epic.    He does have a POA but we do NOT have it on file in Epic.    Discussed Advance Care Planning with patient (and family/surrogate if present). Standard forms made available to patient in After Visit Summary.      Patient Active Problem List   Diagnosis    Hyperlipidemia [Mixed] / Rx: none [Statins cause myalgias] / LDL Goal // Ultrafast Heart Scan Score [4/12] = 0    EARLE (obstructive sleep apnea) / PFT's [10/13] Normal / Rx Oral Appliance [3/14] [facility]    Renal cyst x 2 // Lt --> 1.3 cm complex; / Rt --> 2.1 cm simple / Dx [10/13] & [11/15] by U/S    Osteoarthritis of left knee    History of colonic polyps    Diverticulosis of large intestine without perforation or abscess without bleeding    Family history of colon cancer    Elevated PSA    Obesity (BMI 30-39.9)     Allergies:  He is allergic to strawberries.    Current Medications:  Outpatient Medications Marked as Taking for the 10/28/24 encounter (Office Visit) with Schriedel, Adam, MD   Medication Sig     finasteride 5 MG Oral Tab Take 1 tablet (5 mg total) by mouth daily.    albuterol (PROVENTIL HFA) 108 (90 Base) MCG/ACT Inhalation Aero Soln Inhale 2 puffs into the lungs every 6 (six) hours as needed for Wheezing.       Medical History:  He  has a past medical history of Allergic rhinitis, Extrinsic asthma, unspecified, H/O [] Rt inguinal hernia repair - BEVERLY Barker (2011), Hyperlipidemia (Review last tests), Melanoma in situ of left lower extremity (HCC) (2018), Mixed rhinitis / Rx: Fexofenadine, Fluticasone (2011), Obesity, Osteoarthritis, Other hemorrhoids (10/08/2018), Polyp of colon (10/08/2018), Presence of other cardiac implants and grafts (Left knee joint replaced), S/P total knee arthroplasty, left, S/P [5/15] Lt knee lateral meniscus repair - HAYDEN Merino (2015), Tubular adenoma (2015), Visual impairment, and Wears glasses.  Surgical History:  He  has a past surgical history that includes tonsillectomy; hernia surgery ([address]); colonoscopy (2013); colonoscopy; knee replacement surgery (Left knee joint); and vasectomy ().   Family History:  His family history includes Colon Cancer in his mother; Heart Disorder in his father.  Social History:  He  reports that he quit smoking about 39 years ago. His smoking use included cigarettes. He started smoking about 44 years ago. He has a 5 pack-year smoking history. He has never used smokeless tobacco. He reports that he does not drink alcohol and does not use drugs.    Tobacco:  He smoked tobacco in the past but quit greater than 12 months ago.  Social History     Tobacco Use   Smoking Status Former    Current packs/day: 0.00    Average packs/day: 1 pack/day for 5.0 years (5.0 ttl pk-yrs)    Types: Cigarettes    Start date: 1980    Quit date: 1985    Years since quittin.8   Smokeless Tobacco Never          CAGE Alcohol Screen:   CAGE screening score of 0 on 10/28/2024, showing low risk of alcohol  abuse.      Patient Care Team:  Schriedel, Adam, MD as PCP - General (Internal Medicine)  Nicol Ramírez APRN (Nurse Practitioner Acute Care)    Review of Systems     Negative except hpi  No f/c/chest pain or sob. No cough. No n/v/d. No ha or dizziness. No numbness, tingling, or weakness. No other complaints today.      Objective:   Physical Exam  General Appearance:  Alert, cooperative, no distress, appears stated age   Head:  Normocephalic, without obvious abnormality, atraumatic   Eyes:  PERRL, conjunctiva/corneas clear, EOM's intact, both eyes   Ears:  Normal TM's and external ear canals, both ears   Nose: Nares normal, septum midline, mucosa normal, no drainage or sinus tenderness   Throat: Lips, mucosa, and tongue normal; teeth and gums normal   Neck: Supple, symmetrical, trachea midline, no adenopathy, thyroid: not enlarged, symmetric, no tenderness/mass/nodules, no carotid bruit or JVD   Back:   Symmetric, no curvature, ROM normal, no CVA tenderness   Lungs:   Clear to auscultation bilaterally, respirations unlabored   Chest Wall:  No tenderness or deformity   Heart:  Regular rate and rhythm, S1, S2 normal, no murmur, rub or gallop   Abdomen:   Soft, non-tender, bowel sounds active all four quadrants,  no masses, no organomegaly   Genitalia: deferred   Rectal: deferred   Extremities: Extremities normal, atraumatic, no cyanosis or edema   Pulses: 2+ and symmetric   Skin: Skin color, texture, turgor normal, no rashes or lesions   Lymph nodes: Cervical, supraclavicular, and axillary nodes normal   Neurologic: Normal     /80   Pulse 71   Temp 97.7 °F (36.5 °C)   Ht 6' 2\" (1.88 m)   Wt 279 lb 4 oz (126.7 kg)   SpO2 100%   BMI 35.85 kg/m²  Estimated body mass index is 35.85 kg/m² as calculated from the following:    Height as of this encounter: 6' 2\" (1.88 m).    Weight as of this encounter: 279 lb 4 oz (126.7 kg).    Medicare Hearing Assessment:   Hearing Screening    Time taken: 10/28/2024 10:33  AM  Screening Method: Finger Rub  Finger Rub Result: Pass         Visual Acuity:   Right Eye Visual Acuity: Corrected Right Eye Chart Acuity: 20/30   Left Eye Visual Acuity: Corrected Left Eye Chart Acuity: 20/25   Both Eyes Visual Acuity: Corrected Both Eyes Chart Acuity: 20/20   Able To Tolerate Visual Acuity: Yes        Assessment & Plan:   Demetrio Matta is a 69 year old male who presents for a Medicare Assessment.     1. Hyperlipidemia [Mixed] / Rx: none [Statins cause myalgias] / LDL Goal // Ultrafast Heart Scan Score [4/12] = 0 (Primary)  -     Expanded, Low Complexity (80338)  Well controlled ocntinuemeds  2. Obesity (BMI 30-39.9)  -     Expanded, Low Complexity (69194)  Improving weight continue observation  3. History of colonic polyps  -     Expanded, Low Complexity (17959)  resolved  4. Diverticulosis of large intestine without perforation or abscess without bleeding  -     Expanded, Low Complexity (71918)  Stable continue iobservation  5. Primary osteoarthritis of left knee  -     Expanded, Low Complexity (06362)  Stable continue observation and exercise  6. Renal cyst x 2 // Lt --> 1.3 cm complex; / Rt --> 2.1 cm simple / Dx [10/13] & [11/15] by U/S  -     Expanded, Low Complexity (72388)  Stable continue observation  7. Elevated PSA  -     Expanded, Low Complexity (36221)  Stable continue f/uw with gu  8. Family history of colon cancer  -     Expanded, Low Complexity (62413)  resolved  9. DANTE (obstructive sleep apnea) / PFT's [10/13] Normal / Rx Oral Appliance [3/14] [facility]  -     Expanded, Low Complexity (35025)  Well controlled cotineu cpap  10. Encounter for annual health examination    The patient indicates understanding of these issues and agrees to the plan.  Reinforced healthy diet, lifestyle, and exercise.      Return in 1 year (on 10/28/2025).     Adam Schriedel, MD, 11/7/2024     Supplementary Documentation:   General Health:  In the past six months, have you  lost more than 10 pounds without trying?: 2 - No  Has your appetite been poor?: No  Type of Diet: Balanced  How does the patient maintain a good energy level?: Appropriate Exercise  How would you describe your daily physical activity?: Light  How would you describe your current health state?: Good  How do you maintain positive mental well-being?: Social Interaction  On a scale of 0 to 10, with 0 being no pain and 10 being severe pain, what is your pain level?: 0 - (None)  In the past six months, have you experienced urine leakage?: 0-No  At any time do you feel concerned for the safety/well-being of yourself and/or your children, in your home or elsewhere?: No  Have you had any immunizations at another office such as Influenza, Hepatitis B, Tetanus, or Pneumococcal?: No    Health Maintenance   Topic Date Due    COVID-19 Vaccine (5 - 2023-24 season) 09/01/2024    Annual Physical  10/06/2024    Colorectal Cancer Screening  10/16/2025    PSA  10/03/2026    Influenza Vaccine  Completed    Annual Depression Screening  Completed    Fall Risk Screening (Annual)  Completed    Pneumococcal Vaccine: 65+ Years  Completed    Zoster Vaccines  Completed

## 2024-11-22 ENCOUNTER — HOSPITAL ENCOUNTER (OUTPATIENT)
Age: 69
Discharge: HOME | End: 2024-11-22
Payer: MEDICARE

## 2024-11-22 VITALS
DIASTOLIC BLOOD PRESSURE: 87 MMHG | WEIGHT: 270 LBS | SYSTOLIC BLOOD PRESSURE: 137 MMHG | HEIGHT: 74 IN | RESPIRATION RATE: 18 BRPM | HEART RATE: 79 BPM | TEMPERATURE: 98 F | BODY MASS INDEX: 34.65 KG/M2 | OXYGEN SATURATION: 95 %

## 2024-11-22 DIAGNOSIS — H66.91 ACUTE RIGHT OTITIS MEDIA: Primary | ICD-10-CM

## 2024-11-22 PROCEDURE — 99213 OFFICE O/P EST LOW 20 MIN: CPT | Performed by: PHYSICIAN ASSISTANT

## 2024-11-22 NOTE — ED PROVIDER NOTES
Patient Seen in: [facility] [place]      History     Chief Complaint   Patient presents with    Ear Problem Pain     Stated Complaint: ear ache    Subjective:   HPI    70 YO male with below stated past medical history presents to immediate care for evaluation of right ear pain with ringing starting last night.  He denies hearing loss, ear drainage, fever.        Objective:     Past Medical History:    Allergic rhinitis    Extrinsic asthma, unspecified    H/O [] Rt inguinal hernia repair - B. Green    Hyperlipidemia    Melanoma in situ of left lower extremity (HCC)    Mixed rhinitis / Rx: Fexofenadine, Fluticasone    Obesity    Osteoarthritis    Other hemorrhoids    Polyp of colon    Presence of other cardiac implants and grafts    S/P total knee arthroplasty, left    S/P [5/15] Lt knee lateral meniscus repair - NENITA. Angelique    Tubular adenoma    Visual impairment    Wears glasses              Past Surgical History:   Procedure Laterality Date    Colonoscopy  2013    Procedure: COLONOSCOPY;  Surgeon: Virgilio Meehan MD;  Location:  ENDOSCOPY    Colonoscopy      Hernia surgery  [address]    Laparoscopic Right Inguinal Hernia Repair    Knee replacement surgery  Left knee joint    Tonsillectomy      Vasectomy                  Social History     Socioeconomic History    Marital status:    Tobacco Use    Smoking status: Former     Current packs/day: 0.00     Average packs/day: 1 pack/day for 5.0 years (5.0 ttl pk-yrs)     Types: Cigarettes     Start date: 1980     Quit date: 1985     Years since quittin.9    Smokeless tobacco: Never   Vaping Use    Vaping status: Never Used   Substance and Sexual Activity    Alcohol use: No    Drug use: No              Review of Systems    Positive for stated complaint: ear ache  Other systems are as noted in HPI.  Constitutional and vital signs reviewed.      All other systems reviewed and negative except as noted above.    Physical Exam     ED  Triage Vitals [11/22/24 0804]   /87   Pulse 79   Resp 18   Temp 97.8 °F (36.6 °C)   Temp src Temporal   SpO2 95 %   O2 Device None (Room air)       Current Vitals:   Vital Signs  BP: 137/87  Pulse: 79  Resp: 18  Temp: 97.8 °F (36.6 °C)  Temp src: Temporal    Oxygen Therapy  SpO2: 95 %  O2 Device: None (Room air)        Physical Exam  Vitals and nursing note reviewed.   Constitutional:       General: He is not in acute distress.     Appearance: Normal appearance. He is not ill-appearing, toxic-appearing or diaphoretic.   HENT:      Right Ear: Tympanic membrane is injected and bulging.      Left Ear: Tympanic membrane is not erythematous or bulging.   Cardiovascular:      Rate and Rhythm: Normal rate.   Pulmonary:      Effort: Pulmonary effort is normal. No respiratory distress.   Neurological:      Mental Status: He is alert and oriented to person, place, and time.   Psychiatric:         Behavior: Behavior normal.         ED Course   Labs Reviewed - No data to display       MDM      Differential diagnosis considered but not limited to cerumen impaction, acute otitis media, otitis externa    Afebrile and well-appearing. Physical exam as above consistent with right acute otitis media.  Augmentin prescribed.  Ibuprofen and/or Tylenol as needed for pain.  Return precautions discussed with understanding.      Medical Decision Making  Risk  OTC drugs.  Prescription drug management.        Disposition and Plan     Clinical Impression:  1. Acute right otitis media         Disposition:  Discharge  11/22/2024  8:17 am    Follow-up:  [facility] [address] [address] [phone]    If symptoms worsen          Medications Prescribed:  Current Discharge Medication List        START taking these medications    Details   amoxicillin clavulanate 875-125 MG Oral Tab Take 1 tablet by mouth 2 (two) times daily for 7 days.  Qty: 14 tablet, Refills: 0                 Supplementary Documentation:

## 2025-04-25 ENCOUNTER — TELEPHONE (OUTPATIENT)
Facility: CLINIC | Age: 70
End: 2025-04-25

## 2025-04-25 DIAGNOSIS — G47.33 OSA (OBSTRUCTIVE SLEEP APNEA): Primary | ICD-10-CM

## 2025-06-03 ENCOUNTER — OFFICE VISIT (OUTPATIENT)
Dept: SLEEP CENTER | Age: 70
End: 2025-06-03
Attending: Other
Payer: MEDICARE

## 2025-06-03 DIAGNOSIS — G47.33 OSA (OBSTRUCTIVE SLEEP APNEA): Primary | ICD-10-CM

## 2025-06-03 PROCEDURE — 95806 SLEEP STUDY UNATT&RESP EFFT: CPT

## 2025-06-04 ENCOUNTER — TELEPHONE (OUTPATIENT)
Dept: INTERNAL MEDICINE CLINIC | Facility: CLINIC | Age: 70
End: 2025-06-04

## 2025-06-04 DIAGNOSIS — Z13.220 SCREENING FOR LIPID DISORDERS: ICD-10-CM

## 2025-06-04 DIAGNOSIS — Z13.29 SCREENING FOR THYROID DISORDER: ICD-10-CM

## 2025-06-04 DIAGNOSIS — Z13.0 SCREENING FOR DISORDER OF BLOOD AND BLOOD-FORMING ORGANS: ICD-10-CM

## 2025-06-04 DIAGNOSIS — Z13.228 SCREENING FOR METABOLIC DISORDER: ICD-10-CM

## 2025-06-04 DIAGNOSIS — Z00.00 ROUTINE GENERAL MEDICAL EXAMINATION AT A HEALTH CARE FACILITY: ICD-10-CM

## 2025-06-04 DIAGNOSIS — E78.2 MIXED HYPERLIPIDEMIA: Primary | ICD-10-CM

## 2025-06-04 NOTE — TELEPHONE ENCOUNTER
Patient called request labs prior to their annual physical.  Annual physical scheduled for   Future Appointments   Date Time Provider Department Center   10/31/2025 10:30 AM Schriedel, Adam, MD [facility]       Please order labs. Patient preferred lab is [facility]  Patient informed request was sent to clinical team.  Patient informed to fast for labs.  No callback required.

## 2025-06-09 ENCOUNTER — SLEEP STUDY (OUTPATIENT)
Facility: CLINIC | Age: 70
End: 2025-06-09
Payer: MEDICARE

## 2025-06-09 DIAGNOSIS — G47.33 OBSTRUCTIVE SLEEP APNEA SYNDROME: Primary | ICD-10-CM

## 2025-06-09 PROCEDURE — 95811 POLYSOM 6/>YRS CPAP 4/> PARM: CPT | Performed by: OTHER

## (undated) DEVICE — SUTURE STRATAFIX PDS PLUS 45CM

## (undated) DEVICE — PADDING CAST COTTON  4

## (undated) DEVICE — 3M™ IOBAN™ 2 ANTIMICROBIAL INCISE DRAPE 6651EZ: Brand: IOBAN™ 2

## (undated) DEVICE — WRAP COOLING KNEE W/ICE PILLOW

## (undated) DEVICE — CHLORAPREP 26ML APPLICATOR

## (undated) DEVICE — Device: Brand: STABLECUT®

## (undated) DEVICE — HOOD, PEEL-AWAY: Brand: FLYTE

## (undated) DEVICE — DECANTER BAG 9": Brand: MEDLINE INDUSTRIES, INC.

## (undated) DEVICE — INSTRUMENT FEE

## (undated) DEVICE — SPECIMEN CONTAINER,POSITIVE SEAL INDICATOR, OR PACKAGED: Brand: PRECISION

## (undated) DEVICE — 3M™ MICROFOAM™ TAPE 1528-4: Brand: 3M™ MICROFOAM™

## (undated) DEVICE — TOTAL KNEE CDS: Brand: MEDLINE INDUSTRIES, INC.

## (undated) DEVICE — ZIMMER® STERILE DISPOSABLE TOURNIQUET CUFF WITH PLC, DUAL PORT, SINGLE BLADDER, 34 IN. (86 CM)

## (undated) DEVICE — CONVERTORS STOCKINETTE: Brand: CONVERTORS

## (undated) DEVICE — ATTUNE PINNING SYSTEM
Type: IMPLANTABLE DEVICE | Site: KNEE
Brand: ATTUNE

## (undated) DEVICE — DRAPE,U/SHT,SPLIT,FILM,60X84,STERILE: Brand: MEDLINE

## (undated) DEVICE — SUTURE VICRYL 2-0 CP-1

## (undated) DEVICE — SOL  .9 1000ML BTL

## (undated) DEVICE — BOWL CEMENT MIX QUICK-VAC

## (undated) DEVICE — STERILE POLYISOPRENE POWDER-FREE SURGICAL GLOVES: Brand: PROTEXIS

## (undated) DEVICE — DRESSING AQUACEL AG 3.5X12

## (undated) DEVICE — GOWN SURG AERO CHROME XXL

## (undated) DEVICE — UNIVERSAL STERIBUMP® STERILE (5/CASE): Brand: UNIVERSAL STERIBUMP®

## (undated) DEVICE — BANDAGE ELASTIC ACE 6\" X-LONG

## (undated) DEVICE — 3M™ COBAN™ NL STERILE NON-LATEX SELF-ADHERENT WRAP, 2084S, 4 IN X 5 YD (10 CM X 4,5 M), 18 ROLLS/CASE: Brand: 3M™ COBAN™

## (undated) DEVICE — KENDALL SCD EXPRESS SLEEVES, KNEE LENGTH, MEDIUM: Brand: KENDALL SCD

## (undated) DEVICE — Device: Brand: PLUMEPEN

## (undated) NOTE — LETTER
05/14/21        [place] [id number]      Dear Brianna Castorena,    Our records indicate that you have outstanding lab work and or testing that was ordered for you and has not yet been completed:  Orders Placed This En

## (undated) NOTE — LETTER
[facility] [address], [address] [place] [address]    Consent for Anesthesia   1.   ILennox agree to be cared for by an anesthesiologist, who is specially trained to monitor me and give me medicine to put me to sleep or keep me vision, nerves, or muscles and in extremely rare instances death. 5. My doctor has explained to me other choices available to me for my care (alternatives).   6. Pregnant Patients (“epidural”):  I understand that the risks of having an epidural (medicine g